# Patient Record
Sex: FEMALE | Race: WHITE | Employment: FULL TIME | URBAN - METROPOLITAN AREA
[De-identification: names, ages, dates, MRNs, and addresses within clinical notes are randomized per-mention and may not be internally consistent; named-entity substitution may affect disease eponyms.]

---

## 2017-02-06 ENCOUNTER — HOSPITAL ENCOUNTER (EMERGENCY)
Age: 27
Discharge: HOME | End: 2017-02-06
Attending: EMERGENCY MEDICINE
Payer: COMMERCIAL

## 2017-02-06 ENCOUNTER — APPOINTMENT (OUTPATIENT)
Dept: ULTRASOUND IMAGING | Age: 27
End: 2017-02-06
Attending: EMERGENCY MEDICINE
Payer: COMMERCIAL

## 2017-02-06 VITALS
TEMPERATURE: 97.9 F | DIASTOLIC BLOOD PRESSURE: 66 MMHG | HEIGHT: 69 IN | RESPIRATION RATE: 16 BRPM | WEIGHT: 166.45 LBS | SYSTOLIC BLOOD PRESSURE: 107 MMHG | OXYGEN SATURATION: 98 % | HEART RATE: 78 BPM | BODY MASS INDEX: 24.65 KG/M2

## 2017-02-06 DIAGNOSIS — O41.8X10 SUBCHORIONIC BLEED, FIRST TRIMESTER: ICD-10-CM

## 2017-02-06 DIAGNOSIS — Z3A.01 LESS THAN 8 WEEKS GESTATION OF PREGNANCY: ICD-10-CM

## 2017-02-06 DIAGNOSIS — R11.2 NAUSEA AND VOMITING, INTRACTABILITY OF VOMITING NOT SPECIFIED, UNSPECIFIED VOMITING TYPE: Primary | ICD-10-CM

## 2017-02-06 DIAGNOSIS — O46.8X1 SUBCHORIONIC BLEED, FIRST TRIMESTER: ICD-10-CM

## 2017-02-06 LAB
ALBUMIN SERPL BCP-MCNC: 3.6 G/DL (ref 3.5–5)
ALBUMIN/GLOB SERPL: 1.1 {RATIO} (ref 1.1–2.2)
ALP SERPL-CCNC: 49 U/L (ref 45–117)
ALT SERPL-CCNC: 39 U/L (ref 12–78)
ANION GAP BLD CALC-SCNC: 7 MMOL/L (ref 5–15)
APPEARANCE UR: ABNORMAL
AST SERPL W P-5'-P-CCNC: 18 U/L (ref 15–37)
BACTERIA URNS QL MICRO: ABNORMAL /HPF
BASOPHILS # BLD AUTO: 0 K/UL (ref 0–0.1)
BASOPHILS # BLD: 0 % (ref 0–1)
BILIRUB SERPL-MCNC: 0.4 MG/DL (ref 0.2–1)
BILIRUB UR QL: NEGATIVE
BUN SERPL-MCNC: 11 MG/DL (ref 6–20)
BUN/CREAT SERPL: 15 (ref 12–20)
CALCIUM SERPL-MCNC: 8.5 MG/DL (ref 8.5–10.1)
CHLORIDE SERPL-SCNC: 106 MMOL/L (ref 97–108)
CO2 SERPL-SCNC: 24 MMOL/L (ref 21–32)
COLOR UR: ABNORMAL
CREAT SERPL-MCNC: 0.73 MG/DL (ref 0.55–1.02)
EOSINOPHIL # BLD: 0 K/UL (ref 0–0.4)
EOSINOPHIL NFR BLD: 0 % (ref 0–7)
EPITH CASTS URNS QL MICRO: ABNORMAL /LPF
ERYTHROCYTE [DISTWIDTH] IN BLOOD BY AUTOMATED COUNT: 12.4 % (ref 11.5–14.5)
GLOBULIN SER CALC-MCNC: 3.2 G/DL (ref 2–4)
GLUCOSE SERPL-MCNC: 103 MG/DL (ref 65–100)
GLUCOSE UR STRIP.AUTO-MCNC: NEGATIVE MG/DL
HCG SERPL-ACNC: ABNORMAL MIU/ML (ref 0–6)
HCT VFR BLD AUTO: 38.6 % (ref 35–47)
HGB BLD-MCNC: 13.8 G/DL (ref 11.5–16)
HGB UR QL STRIP: NEGATIVE
KETONES UR QL STRIP.AUTO: NEGATIVE MG/DL
LEUKOCYTE ESTERASE UR QL STRIP.AUTO: ABNORMAL
LYMPHOCYTES # BLD AUTO: 12 % (ref 12–49)
LYMPHOCYTES # BLD: 1.5 K/UL (ref 0.8–3.5)
MCH RBC QN AUTO: 29.7 PG (ref 26–34)
MCHC RBC AUTO-ENTMCNC: 35.8 G/DL (ref 30–36.5)
MCV RBC AUTO: 83 FL (ref 80–99)
MONOCYTES # BLD: 0.5 K/UL (ref 0–1)
MONOCYTES NFR BLD AUTO: 4 % (ref 5–13)
MUCOUS THREADS URNS QL MICRO: ABNORMAL /LPF
NEUTS SEG # BLD: 10.7 K/UL (ref 1.8–8)
NEUTS SEG NFR BLD AUTO: 84 % (ref 32–75)
NITRITE UR QL STRIP.AUTO: NEGATIVE
PH UR STRIP: 7.5 [PH] (ref 5–8)
PLATELET # BLD AUTO: 208 K/UL (ref 150–400)
POTASSIUM SERPL-SCNC: 3.9 MMOL/L (ref 3.5–5.1)
PROT SERPL-MCNC: 6.8 G/DL (ref 6.4–8.2)
PROT UR STRIP-MCNC: ABNORMAL MG/DL
RBC # BLD AUTO: 4.65 M/UL (ref 3.8–5.2)
RBC #/AREA URNS HPF: ABNORMAL /HPF (ref 0–5)
SODIUM SERPL-SCNC: 137 MMOL/L (ref 136–145)
SP GR UR REFRACTOMETRY: 1.02 (ref 1–1.03)
UROBILINOGEN UR QL STRIP.AUTO: 0.2 EU/DL (ref 0.2–1)
WBC # BLD AUTO: 12.7 K/UL (ref 3.6–11)
WBC URNS QL MICRO: ABNORMAL /HPF (ref 0–4)

## 2017-02-06 PROCEDURE — 74011250636 HC RX REV CODE- 250/636: Performed by: EMERGENCY MEDICINE

## 2017-02-06 PROCEDURE — 80053 COMPREHEN METABOLIC PANEL: CPT | Performed by: EMERGENCY MEDICINE

## 2017-02-06 PROCEDURE — 36415 COLL VENOUS BLD VENIPUNCTURE: CPT | Performed by: EMERGENCY MEDICINE

## 2017-02-06 PROCEDURE — 99284 EMERGENCY DEPT VISIT MOD MDM: CPT

## 2017-02-06 PROCEDURE — 81001 URINALYSIS AUTO W/SCOPE: CPT | Performed by: EMERGENCY MEDICINE

## 2017-02-06 PROCEDURE — 76817 TRANSVAGINAL US OBSTETRIC: CPT

## 2017-02-06 PROCEDURE — 96374 THER/PROPH/DIAG INJ IV PUSH: CPT

## 2017-02-06 PROCEDURE — 84702 CHORIONIC GONADOTROPIN TEST: CPT | Performed by: EMERGENCY MEDICINE

## 2017-02-06 PROCEDURE — 85025 COMPLETE CBC W/AUTO DIFF WBC: CPT | Performed by: EMERGENCY MEDICINE

## 2017-02-06 PROCEDURE — 96376 TX/PRO/DX INJ SAME DRUG ADON: CPT

## 2017-02-06 RX ORDER — ONDANSETRON 4 MG/1
4 TABLET, ORALLY DISINTEGRATING ORAL
Qty: 16 TAB | Refills: 0 | Status: SHIPPED | OUTPATIENT
Start: 2017-02-06

## 2017-02-06 RX ORDER — ONDANSETRON 2 MG/ML
4 INJECTION INTRAMUSCULAR; INTRAVENOUS
Status: COMPLETED | OUTPATIENT
Start: 2017-02-06 | End: 2017-02-06

## 2017-02-06 RX ADMIN — ONDANSETRON HYDROCHLORIDE 4 MG: 2 INJECTION, SOLUTION INTRAMUSCULAR; INTRAVENOUS at 05:57

## 2017-02-06 RX ADMIN — ONDANSETRON HYDROCHLORIDE 4 MG: 2 INJECTION, SOLUTION INTRAMUSCULAR; INTRAVENOUS at 07:02

## 2017-02-06 NOTE — ED PROVIDER NOTES
HPI Comments: Chaim Celeste is a 6 week gravid by date, 31 y.o. female (G1) who presents ambulatory to the ED with c/o n/v/d x 0100 this morning. She reports associated crampy periumbilical discomfort. Pt states her diarrhea has since ceased but continues to have nausea and vomiting. She denies any recent sick contacts. Pt states she has not yet seen her OBGYN but has an appointment in 2 weeks. She denies having had any abd pain beyond the cramping she has today and further denies any vaginal bleeding, discharge, gush of fluid, urinary sxs, bowel changes, or fever. PCP: Josse Yap MD  PMHx significant for: pt denies   PSHx significant for: pt denies   Social Hx:  smoking (-)                      alcohol (-)                        There are no other complaints, changes, or physical findings at this time. Written by HARRY Morrell, as dictated by Fernando Goldstein DO     The history is provided by the patient. History reviewed. No pertinent past medical history. History reviewed. No pertinent past surgical history. History reviewed. No pertinent family history. Social History     Social History    Marital status:      Spouse name: N/A    Number of children: N/A    Years of education: N/A     Occupational History    Not on file. Social History Main Topics    Smoking status: Never Smoker    Smokeless tobacco: Not on file    Alcohol use No    Drug use: No    Sexual activity: Not on file     Other Topics Concern    Not on file     Social History Narrative    No narrative on file         ALLERGIES: Advil [ibuprofen] and Demerol [meperidine]    Review of Systems   Constitutional: Negative for fatigue and fever. HENT: Negative. Eyes: Negative. Respiratory: Negative for shortness of breath and wheezing. Cardiovascular: Negative for chest pain and leg swelling. Gastrointestinal: Positive for abdominal pain, diarrhea, nausea and vomiting.  Negative for blood in stool and constipation. Endocrine: Negative. Genitourinary: Negative for difficulty urinating, dysuria, vaginal bleeding and vaginal discharge. Musculoskeletal: Negative. Skin: Negative for rash. Allergic/Immunologic: Negative. Neurological: Negative for weakness and numbness. Hematological: Negative. Psychiatric/Behavioral: Negative. Patient Vitals for the past 12 hrs:   Temp Pulse Resp BP SpO2   02/06/17 0954 - - - - 98 %   02/06/17 0916 - - - 107/66 -   02/06/17 0800 - - - 97/73 98 %   02/06/17 0717 - - - 98/65 100 %   02/06/17 0630 - - - 113/68 100 %   02/06/17 0615 - - - 106/54 100 %   02/06/17 0600 - - - 109/71 99 %   02/06/17 0545 - - - 107/69 97 %   02/06/17 0536 - - - 132/81 -   02/06/17 0505 97.9 °F (36.6 °C) 78 16 110/69 99 %             Physical Exam   Constitutional: She is oriented to person, place, and time. She appears well-developed and well-nourished. No distress. HENT:   Head: Normocephalic and atraumatic. Mouth/Throat: Oropharynx is clear and moist.   Eyes: Conjunctivae and EOM are normal.   Neck: Neck supple. No JVD present. No tracheal deviation present. Cardiovascular: Normal rate, regular rhythm and intact distal pulses. Exam reveals no gallop and no friction rub. No murmur heard. Pulmonary/Chest: Effort normal and breath sounds normal. No stridor. No respiratory distress. She has no wheezes. Abdominal: Soft. Bowel sounds are normal. She exhibits no distension and no mass. There is no tenderness. There is no guarding. Musculoskeletal: Normal range of motion. She exhibits no edema or tenderness. No deformity   Neurological: She is alert and oriented to person, place, and time. She has normal strength. No focal deficits   Skin: Skin is warm, dry and intact. No rash noted. Psychiatric: She has a normal mood and affect. Her behavior is normal. Judgment and thought content normal.   Nursing note and vitals reviewed.        MDM  Number of Diagnoses or Management Options  Less than 8 weeks gestation of pregnancy:   Nausea and vomiting, intractability of vomiting not specified, unspecified vomiting type:   Subchorionic bleed, first trimester:   Diagnosis management comments: Pt with n/v/d, is 6 weeks pregnant and has not had her first prenatal visit yet. Lower suspicion that symptoms are pregnancy related as patient's abdominal exam is benign, and she has no vaginal bleeding or discharge. DDx includes gastroenteritis, dehydration, electrolyte abnormality, uti. Will treat symptomatically, check labs. Will confirm IUP. Amount and/or Complexity of Data Reviewed  Clinical lab tests: ordered and reviewed      ED Course       Procedures     Progress Note:  6:21 AM  Pt noted to be feeling better. Written by Lisa Kothari, ED scribe, as dictated by Warren Jennings DO      SIGN OUT:  8:30 AM  Patient's presentation, labs/imaging and plan of care was reviewed with Tanya Temple MD as part of sign out. They will f/u on US results as part of the plan discussed with the patient. Tanya Temple MD's assistance in completion of this plan is greatly appreciated but it should be noted that I will be the provider of record for this patient. Warren Jennings DO    PROGRESS NOTE:   10:25 AM  Pt feels okay. Will PO challenge her.   Written by Rafi Solo, ED Scribe, as dictated by Tanya Temple MD.     LABORATORY TESTS:  Recent Results (from the past 12 hour(s))   CBC WITH AUTOMATED DIFF    Collection Time: 02/06/17  5:39 AM   Result Value Ref Range    WBC 12.7 (H) 3.6 - 11.0 K/uL    RBC 4.65 3.80 - 5.20 M/uL    HGB 13.8 11.5 - 16.0 g/dL    HCT 38.6 35.0 - 47.0 %    MCV 83.0 80.0 - 99.0 FL    MCH 29.7 26.0 - 34.0 PG    MCHC 35.8 30.0 - 36.5 g/dL    RDW 12.4 11.5 - 14.5 %    PLATELET 565 630 - 711 K/uL    NEUTROPHILS 84 (H) 32 - 75 %    LYMPHOCYTES 12 12 - 49 %    MONOCYTES 4 (L) 5 - 13 %    EOSINOPHILS 0 0 - 7 %    BASOPHILS 0 0 - 1 %    ABS. NEUTROPHILS 10.7 (H) 1.8 - 8.0 K/UL    ABS. LYMPHOCYTES 1.5 0.8 - 3.5 K/UL    ABS. MONOCYTES 0.5 0.0 - 1.0 K/UL    ABS. EOSINOPHILS 0.0 0.0 - 0.4 K/UL    ABS. BASOPHILS 0.0 0.0 - 0.1 K/UL   METABOLIC PANEL, COMPREHENSIVE    Collection Time: 02/06/17  5:39 AM   Result Value Ref Range    Sodium 137 136 - 145 mmol/L    Potassium 3.9 3.5 - 5.1 mmol/L    Chloride 106 97 - 108 mmol/L    CO2 24 21 - 32 mmol/L    Anion gap 7 5 - 15 mmol/L    Glucose 103 (H) 65 - 100 mg/dL    BUN 11 6 - 20 MG/DL    Creatinine 0.73 0.55 - 1.02 MG/DL    BUN/Creatinine ratio 15 12 - 20      GFR est AA >60 >60 ml/min/1.73m2    GFR est non-AA >60 >60 ml/min/1.73m2    Calcium 8.5 8.5 - 10.1 MG/DL    Bilirubin, total 0.4 0.2 - 1.0 MG/DL    ALT (SGPT) 39 12 - 78 U/L    AST (SGOT) 18 15 - 37 U/L    Alk. phosphatase 49 45 - 117 U/L    Protein, total 6.8 6.4 - 8.2 g/dL    Albumin 3.6 3.5 - 5.0 g/dL    Globulin 3.2 2.0 - 4.0 g/dL    A-G Ratio 1.1 1.1 - 2.2     URINALYSIS W/ RFLX MICROSCOPIC    Collection Time: 02/06/17  5:39 AM   Result Value Ref Range    Color YELLOW/STRAW      Appearance CLOUDY (A) CLEAR      Specific gravity 1.023 1.003 - 1.030      pH (UA) 7.5 5.0 - 8.0      Protein TRACE (A) NEG mg/dL    Glucose NEGATIVE  NEG mg/dL    Ketone NEGATIVE  NEG mg/dL    Bilirubin NEGATIVE  NEG      Blood NEGATIVE  NEG      Urobilinogen 0.2 0.2 - 1.0 EU/dL    Nitrites NEGATIVE  NEG      Leukocyte Esterase MODERATE (A) NEG      WBC 5-10 0 - 4 /hpf    RBC 0-5 0 - 5 /hpf    Epithelial cells MODERATE (A) FEW /lpf    Bacteria 1+ (A) NEG /hpf    Mucus 1+ (A) NEG /lpf   TOTAL HCG, QT. Collection Time: 02/06/17  5:39 AM   Result Value Ref [place]    [place] [address] (H) 0 - 6 MIU/ML       IMAGING RESULTS:  US UTS TRANSVAGINAL OB   Final Result   Clinical history: Pregnant, nausea, vomiting      Realtime sonographic imaging of the pelvis was performed transvaginally. The  uterus measures 9.3 x 4.8 x 6.0 cm and is normal in appearance.  The examination  demonstrates a single viable intrauterine pregnancy with a crown-rump length of  0.45 cm and estimated gestational age of 6 weeks 2 days. The gestational sac is  normal in size and appearance. There is a small subchorionic bleed. The  placenta cannot be evaluated given the early age of the pregnancy. Fetal heart  rate is 111 beats per minute. The left ovary measures 3.3 x 3.1 x 2.4 cm and the  right ovary measures 2.7 x 0.9 x 1.8 cm. There is a 2.7 x 2.0 x 2.0 cm simple  cyst in the left ovary. The right ovary is normal in appearance. Blood flow is  documented within both ovaries. Trace free fluid is noted in the cul-de-sac.     IMPRESSION  IMPRESSION: Single viable intrauterine pregnancy with estimated gestational age  of 6 weeks 2 days. Small subchorionic bleed. Simple cyst left ovary. Signed by      Signed Date/Time    Phone Pager     Petrais[place] Jaret 2/06/2017 09:35 [phone]           MEDICATIONS GIVEN:  Medications   ondansetron [facility] injection 4 mg (4 mg IntraVENous Given 2/6/17 0557)   ondansetron (ZOFRAN) injection 4 mg (4 mg IntraVENous Given 2/6/17 0702)       IMPRESSION:  1. Nausea and vomiting, intractability of vomiting not specified, unspecified vomiting type    2. Less than 8 weeks gestation of pregnancy    3. Subchorionic bleed, first trimester        PLAN:  1. Current Discharge Medication List      START taking these medications    Details   ondansetron (ZOFRAN ODT) 4 mg disintegrating tablet Take 1 Tab by mouth every eight (8) hours as needed for Nausea. Qty: 16 Tab, Refills: 0         CONTINUE these medications which have NOT CHANGED    Details   Cetirizine (ZYRTEC) 10 mg cap Take 10 mg by mouth.      prenatal multivit-ca-min-fe-fa tab Take  by mouth.            2.   Follow-up Information     Follow up With Details Comments Maurizio Jackson MD In 10 days  [address] [facility] [address] [place] [address] Pkw[address]  [address] [facility] Gayathri Delarosa MD   [address] [place] [address] [address] [phone]      [facility] EMERGENCY DEPT  As needed, If symptoms worsen [address]  [address] [facility]  [phone]        Return to ED if worse     Discharge Note:  10:29 AM  The patient is ready for discharge. The patient's signs, symptoms, diagnosis, and discharge instruction have been discussed and the patient has conveyed their understanding. The patient is to follow up as recommended or return to the ER should their symptoms worsen. Plan has been discussed and the patient is in agreement. Written by Sree Roberts, ED Scribe, as dictated by Keyona Vinson MD.     This note is prepared by Chucky Cummings, acting as scribe for Lina Fritz DO: The scribe's documentation has been prepared under my direction and personally reviewed by me in its entirety. I confirm that the note above accurately reflects all work, treatment, procedures, and medical decision making performed by me.

## 2017-02-06 NOTE — ED NOTES
Patient resting on stretcher with lights lowered for comfort. Denies pain. Plan of care reviewed and opportunity for questions was provided.

## 2017-02-06 NOTE — ED NOTES
Bedside shift change report given to Jhon Fiore  (oncoming nurse) by [facility]  (offgoing nurse). Report included the following information SBAR and ED Summary.

## 2017-02-06 NOTE — DISCHARGE INSTRUCTIONS
Weeks 6 to 10 of Your Pregnancy: Care Instructions  Your Care Instructions    Congratulations on your pregnancy. This is an exciting and important time for you. During the first 6 to 10 weeks of your pregnancy, your body goes through many changes. Your baby grows very fast, even though you cannot feel it yet. You may start to notice that you feel different, both in your body and your emotions. Because each woman's pregnancy is unique, there is no right way to feel. You may feel the healthiest you have ever been, or you may feel tired or sick to your stomach (\"morning sickness\"). These early weeks are a time to make healthy choices and to eat the best foods for you and your baby. This care sheet will give you some ideas. This is also a good time to think about birth defects testing. These are tests done during pregnancy to look for possible problems with the baby. First trimester tests for birth defects can be done between 8 and 17 weeks of pregnancy, depending on the test. Talk with your doctor about what kinds of tests are available. Follow-up care is a key part of your treatment and safety. Be sure to make and go to all appointments, and call your doctor if you are having problems. It's also a good idea to know your test results and keep a list of the medicines you take. How can you care for yourself at home? Eat well  · Eat at least 3 meals and 2 healthy snacks every day. Eat fresh, whole foods, including:  ¨ 7 or more servings of bread, tortillas, cereal, rice, pasta, or oatmeal.  ¨ 3 or more servings of vegetables, especially leafy green vegetables. ¨ 2 or more servings of fruits. ¨ 3 or more servings of milk, yogurt, or cheese. ¨ 2 or more servings of meat, turkey, chicken, fish, eggs, or dried beans. · Drink plenty of fluids, especially water. Avoid sodas and other sweetened drinks. · Choose foods that have important vitamins for your baby, such as calcium, iron, and folate.   ¨ Dairy products, tofu, canned fish with bones, almonds, broccoli, dark leafy greens, corn tortillas, and fortified orange juice are good sources of calcium. ¨ Beef, poultry, liver, spinach, lentils, dried beans, fortified cereals, and dried fruits are rich in iron. ¨ Dark leafy greens, broccoli, asparagus, liver, fortified cereals, orange juice, peanuts, and almonds are good sources of folate. · Avoid foods that could harm your baby. ¨ Do not eat raw or undercooked meat, chicken, or fish (such as sushi or raw oysters). ¨ Do not eat raw eggs or foods that contain raw eggs, such as Caesar dressing. ¨ Do not eat soft cheeses and unpasteurized dairy foods, such as Brie, feta, or blue cheese. ¨ Do not eat fish that contains a lot of mercury, such as shark, swordfish, tilefish, or nancy mackerel. Do not eat more than 6 ounces of tuna each week. ¨ Do not eat raw sprouts, especially alfalfa sprouts. ¨ Cut down on caffeine, such as coffee, tea, and cola. Protect yourself and your baby  · Do not touch gina litter or cat feces. They can cause an infection that could harm your baby. · High body temperature can be harmful to your baby. So if you want to use a sauna or hot tub, be sure to talk to your doctor about how to use it safely. [place] with morning sickness  · Sip small amounts of water, juices, or shakes. Try drinking between meals, not with meals. · Eat 5 or 6 small meals a day. Try dry toast or crackers when you first get up, and eat breakfast a little later. · Avoid spicy, greasy, and fatty foods. · When you feel sick, open your windows or go for a short walk to get fresh air. · Try nausea wristbands. These help some women. · Tell your doctor if you think your prenatal vitamins make you sick. Where can you learn more? Go to http://annetet.info/. Enter G112 in the search box to learn more about \"Weeks 6 to 10 of Your Pregnancy: Care Instructions. \"  Current as of:  May 30, 2016  Content Version: 11.1  © 6064-6302 [employer]. Care instructions adapted under license by [employer] (which disclaims liability or warranty for this information). If you have questions about a medical condition or this instruction, always ask your healthcare professional. [address] any warranty or liability for your use of this information. Nausea and Vomiting: Care Instructions  Your Care Instructions    When you are nauseated, you may feel weak and sweaty and notice a lot of saliva in your mouth. Nausea often leads to vomiting. Most of the time you do not need to worry about nausea and vomiting, but they can be signs of other illnesses. Two common causes of nausea and vomiting are stomach flu and food poisoning. Nausea and vomiting from viral stomach flu will usually start to improve within 24 hours. Nausea and vomiting from food poisoning may last from 12 to 48 hours. The doctor has checked you carefully, but problems can develop later. If you notice any problems or new symptoms, get medical treatment right away. Follow-up care is a key part of your treatment and safety. Be sure to make and go to all appointments, and call your doctor if you are having problems. It's also a good idea to know your test results and keep a list of the medicines you take. How can you care for yourself at home? · To prevent dehydration, drink plenty of fluids, enough so that your urine is light yellow or clear like water. Choose water and other caffeine-free clear liquids until you feel better. If you have kidney, heart, or liver disease and have to limit fluids, talk with your doctor before you increase the amount of fluids you drink. · Rest in bed until you feel better. · When you are able to eat, try clear soups, mild foods, and liquids until all symptoms are gone for 12 to 48 hours.  Other good choices include dry toast, crackers, cooked cereal, and gelatin dessert, such as [place]. When should you call for help? Call 911 anytime you think you may need emergency care. For example, call if:  · You passed out (lost consciousness). Call your doctor now or seek immediate medical care if:  · You have symptoms of dehydration, such as:  ¨ Dry eyes and a dry mouth. ¨ Passing only a little dark urine. ¨ Feeling thirstier than usual.  · You have new or worsening belly pain. · You have a new or higher fever. · You vomit blood or what looks like coffee grounds. Watch closely for changes in your health, and be sure to contact your doctor if:  · You have ongoing nausea and vomiting. · Your vomiting is getting worse. · Your vomiting lasts longer than 2 days. · You are not getting better as expected. Where can you learn more? Go to http://arron-varun.info/. Enter 25 [id number] in the search box to learn more about \"Nausea and Vomiting: Care Instructions. \"  Current as of: May 27, 2016  Content Version: 11.1  © 6818-3800 [employer]. Care instructions adapted under license by [employer] (which disclaims liability or warranty for this information). If you have questions about a medical condition or this instruction, always ask your healthcare professional. [address] any warranty or liability for your use of this information.

## 2017-02-17 LAB
CHLAMYDIA, EXTERNAL: NORMAL
HBSAG, EXTERNAL: NON REACTIVE
HIV, EXTERNAL: NON REACTIVE
N. GONORRHEA, EXTERNAL: NORMAL
RUBELLA, EXTERNAL: NORMAL
TYPE, ABO & RH, EXTERNAL: NORMAL

## 2017-07-12 LAB
ANTIBODY SCREEN, EXTERNAL: NORMAL
T. PALLIDUM, EXTERNAL: NORMAL

## 2017-09-08 LAB — GRBS, EXTERNAL: POSITIVE

## 2017-10-05 ENCOUNTER — HOSPITAL ENCOUNTER (INPATIENT)
Age: 27
LOS: 3 days | Discharge: HOME | End: 2017-10-08
Attending: OBSTETRICS & GYNECOLOGY | Admitting: OBSTETRICS & GYNECOLOGY
Payer: COMMERCIAL

## 2017-10-05 PROBLEM — Z37.9 NORMAL LABOR: Status: ACTIVE | Noted: 2017-10-05

## 2017-10-05 LAB
BASOPHILS # BLD: 0 K/UL (ref 0–0.1)
BASOPHILS NFR BLD: 0 % (ref 0–1)
EOSINOPHIL # BLD: 0.1 K/UL (ref 0–0.4)
EOSINOPHIL NFR BLD: 1 % (ref 0–7)
ERYTHROCYTE [DISTWIDTH] IN BLOOD BY AUTOMATED COUNT: 12.7 % (ref 11.5–14.5)
HCT VFR BLD AUTO: 37.3 % (ref 35–47)
HGB BLD-MCNC: 13.4 G/DL (ref 11.5–16)
LYMPHOCYTES # BLD: 1.7 K/UL (ref 0.8–3.5)
LYMPHOCYTES NFR BLD: 19 % (ref 12–49)
MCH RBC QN AUTO: 30.9 PG (ref 26–34)
MCHC RBC AUTO-ENTMCNC: 35.9 G/DL (ref 30–36.5)
MCV RBC AUTO: 86.1 FL (ref 80–99)
MONOCYTES # BLD: 0.5 K/UL (ref 0–1)
MONOCYTES NFR BLD: 5 % (ref 5–13)
NEUTS SEG # BLD: 6.6 K/UL (ref 1.8–8)
NEUTS SEG NFR BLD: 75 % (ref 32–75)
PLATELET # BLD AUTO: 175 K/UL (ref 150–400)
RBC # BLD AUTO: 4.33 M/UL (ref 3.8–5.2)
WBC # BLD AUTO: 8.8 K/UL (ref 3.6–11)

## 2017-10-05 PROCEDURE — 74011250637 HC RX REV CODE- 250/637

## 2017-10-05 PROCEDURE — 36415 COLL VENOUS BLD VENIPUNCTURE: CPT | Performed by: OBSTETRICS & GYNECOLOGY

## 2017-10-05 PROCEDURE — 77030007880 HC KT SPN EPDRL BBMI -B

## 2017-10-05 PROCEDURE — 75410000002 HC LABOR FEE PER 1 HR

## 2017-10-05 PROCEDURE — 77030034849

## 2017-10-05 PROCEDURE — 74011250637 HC RX REV CODE- 250/637: Performed by: OBSTETRICS & GYNECOLOGY

## 2017-10-05 PROCEDURE — 85025 COMPLETE CBC W/AUTO DIFF WBC: CPT | Performed by: OBSTETRICS & GYNECOLOGY

## 2017-10-05 PROCEDURE — 74011250636 HC RX REV CODE- 250/636: Performed by: OBSTETRICS & GYNECOLOGY

## 2017-10-05 PROCEDURE — 74011000258 HC RX REV CODE- 258: Performed by: OBSTETRICS & GYNECOLOGY

## 2017-10-05 PROCEDURE — 65410000002 HC RM PRIVATE OB

## 2017-10-05 RX ORDER — NALBUPHINE HYDROCHLORIDE 10 MG/ML
10 INJECTION, SOLUTION INTRAMUSCULAR; INTRAVENOUS; SUBCUTANEOUS
Status: DISCONTINUED | OUTPATIENT
Start: 2017-10-05 | End: 2017-10-06 | Stop reason: HOSPADM

## 2017-10-05 RX ORDER — SODIUM CHLORIDE 0.9 % (FLUSH) 0.9 %
5-10 SYRINGE (ML) INJECTION AS NEEDED
Status: DISCONTINUED | OUTPATIENT
Start: 2017-10-05 | End: 2017-10-08 | Stop reason: HOSPADM

## 2017-10-05 RX ORDER — ONDANSETRON 2 MG/ML
4 INJECTION INTRAMUSCULAR; INTRAVENOUS
Status: DISCONTINUED | OUTPATIENT
Start: 2017-10-05 | End: 2017-10-06 | Stop reason: HOSPADM

## 2017-10-05 RX ORDER — NALOXONE HYDROCHLORIDE 0.4 MG/ML
0.4 INJECTION, SOLUTION INTRAMUSCULAR; INTRAVENOUS; SUBCUTANEOUS AS NEEDED
Status: DISCONTINUED | OUTPATIENT
Start: 2017-10-05 | End: 2017-10-06 | Stop reason: HOSPADM

## 2017-10-05 RX ORDER — SODIUM CHLORIDE 0.9 % (FLUSH) 0.9 %
5-10 SYRINGE (ML) INJECTION EVERY 8 HOURS
Status: DISCONTINUED | OUTPATIENT
Start: 2017-10-05 | End: 2017-10-06 | Stop reason: HOSPADM

## 2017-10-05 RX ORDER — SODIUM CHLORIDE, SODIUM LACTATE, POTASSIUM CHLORIDE, CALCIUM CHLORIDE 600; 310; 30; 20 MG/100ML; MG/100ML; MG/100ML; MG/100ML
125 INJECTION, SOLUTION INTRAVENOUS CONTINUOUS
Status: DISCONTINUED | OUTPATIENT
Start: 2017-10-05 | End: 2017-10-06 | Stop reason: HOSPADM

## 2017-10-05 RX ORDER — ZOLPIDEM TARTRATE 5 MG/1
5 TABLET ORAL
Status: DISCONTINUED | OUTPATIENT
Start: 2017-10-05 | End: 2017-10-06 | Stop reason: HOSPADM

## 2017-10-05 RX ADMIN — SODIUM CHLORIDE 5 MILLION UNITS: 900 INJECTION, SOLUTION INTRAVENOUS at 09:37

## 2017-10-05 RX ADMIN — DINOPROSTONE 10 MG: 10 INSERT VAGINAL at 21:40

## 2017-10-05 RX ADMIN — SODIUM CHLORIDE, SODIUM LACTATE, POTASSIUM CHLORIDE, AND CALCIUM CHLORIDE 125 ML/HR: 600; 310; 30; 20 INJECTION, SOLUTION INTRAVENOUS at 09:30

## 2017-10-05 RX ADMIN — ZOLPIDEM TARTRATE 5 MG: 5 TABLET ORAL at 21:52

## 2017-10-05 RX ADMIN — PENICILLIN G POTASSIUM 2.5 MILLION UNITS: 20000000 POWDER, FOR SOLUTION INTRAVENOUS at 20:11

## 2017-10-05 RX ADMIN — PENICILLIN G POTASSIUM 2.5 MILLION UNITS: 20000000 POWDER, FOR SOLUTION INTRAVENOUS at 17:16

## 2017-10-05 RX ADMIN — PENICILLIN G POTASSIUM 2.5 MILLION UNITS: 20000000 POWDER, FOR SOLUTION INTRAVENOUS at 13:42

## 2017-10-05 NOTE — PROGRESS NOTES
1035: Per Dr. Silvana Walker, ok for intermittent monitoring. Ok to ambulate in wilde. Taken off EFM, PCN complete. Ambulating in room with .

## 2017-10-05 NOTE — PROGRESS NOTES
Bedside shift change report given to Jasmine Vargas , ARIANA by RICHARD Mccloud RN. Report included the following information SBAR, Kardex, Intake/Output, MAR, Accordion, Recent Results and Med Rec Status. Hourly Rounding performed by RN.   Hayder Rojo, RN

## 2017-10-05 NOTE — PROGRESS NOTES
1425: Taken off EFM. , reactive. Ambulating in wilde with . 1520: Placed back on EFM.   1555: Taken off EFM. , increased fetal movement. NST reactive. 1630: Placed on EFM. Dr. Lenore Jorge to come assess patient. 10/05/17 1635   Cervical Exam   Dilation (cm) 2   Eff 70 %   Vaginal exam done by? Dr. Lenore Jorge     POC continue to monitor intermittently. 1715: Placed on EFM. 1800: Taken of EFM. , NST reactive. 1855: Placed on EFM.

## 2017-10-05 NOTE — PROGRESS NOTES
0915: Pt admitted to 3169 for SROM at 0730 at home. Clear fluid noted. Denies bleeding. Reports mild abdominal pressure intermittently. , 40w4d. EFM applied, IV started, and consents signed.

## 2017-10-05 NOTE — IP AVS SNAPSHOT
[address] [facility] 
[phone] Patient: Meena Mendosa MRN: [id number] You are allergic to the following Allergen Reactions Advil (Ibuprofen) Swelling Aspirin Swelling Demerol (Meperidine) Rash Immunizations Administered for This Admission Name Date Influenza Vaccine (Quad) PF 10/8/2017 Rho(D) Immune Globulin - IM 10/8/2017 Recent Documentation Height Weight Breastfeeding? BMI OB Status Smoking Status 1.727 m 99.8 kg Yes 33.45 kg/m2 Recent pregnancy Never Smoker Unresulted Labs Order Current Status SAMPLE TO BLOOD BANK In process RH IMMUNE GLOBULIN EVAL-LAB ORDER Preliminary result Emergency Contacts Name Discharge Info Relation Home Work Mobile [address] [facility] CAREGIVER [3] Spouse [3] [phone] About your hospitalization You were admitted on:  October 5, 2017 You last received care in the:  [facility] LABOR [facility] DELIVERY You were discharged on:  October 8, 2017 Unit phone number:  [phone] Why you were hospitalized Your primary diagnosis was:  Not on File Your diagnoses also included:  Normal Labor Providers Seen During Your Hospitalizations Provider Role Specialty Primary office phone Lauren Jones MD Attending Provider Obstetrics & Gynecology [phone] Your Primary Care Physician (PCP) Primary Care Physician Office Phone Office Fax Evelyn Daly, [address] [phone] Follow-up Information Follow up With Details Comments Contact Info Lauren Jones MD In 2 weeks perineal laceration check [address] [facility] 
[phone] Raudel Coombs MD   [address] [place] [address] [address] [phone]  Lauren Jones MD In 2 weeks Follow up with [facility] in 2 weeks; call to schedule appointment. [address] [facility] [place] 
[phone] Current Discharge Medication List  
  
START taking these medications Dose & Instructions Dispensing Information Comments Morning Noon Evening Bedtime  
 ferrous sulfate 325 mg (65 mg iron) tablet Your last dose was: Your next dose is:    
   
   
 Dose:  325 mg Take 1 Tab by mouth two (2) times daily (with meals). Quantity:  60 Tab Refills:  1 HYDROmorphone 2 mg tablet Commonly known as:  DILAUDID Your last dose was: Your next dose is:    
   
   
 Dose:  2 mg Take 1 Tab by mouth every four (4) hours as needed for Pain. Max Daily Amount: 12 mg. Quantity:  30 Tab Refills:  0  
     
   
   
   
  
 oxyCODONE-acetaminophen 5-325 mg per tablet Commonly known as:  PERCOCET Your last dose was: Your next dose is:    
   
   
 Dose:  1 Tab Take 1 Tab by mouth every four (4) hours as needed for Pain. Max Daily Amount: 6 Tabs. Quantity:  30 Tab Refills:  0  
     
   
   
   
  
 polyethylene glycol 17 gram packet Commonly known as:  Marlene Floor Your last dose was: Your next dose is:    
   
   
 Dose:  17 g Take 1 Packet by mouth daily as needed. Quantity:  30 Packet Refills:  1 CONTINUE these medications which have NOT CHANGED Dose & Instructions Dispensing Information Comments Morning Noon Evening Bedtime  
 ondansetron 4 mg disintegrating tablet Commonly known as:  ZOFRAN ODT Your last dose was: Your next dose is:    
   
   
 Dose:  4 mg Take 1 Tab by mouth every eight (8) hours as needed for Nausea. Quantity:  16 Tab Refills:  0  
     
   
   
   
  
 prenatal multivit-ca-min-fe-fa Tab Your last dose was: Your next dose is: Take  by mouth. Refills:  0 ZyrTEC 10 mg Cap Generic drug:  Cetirizine Your last dose was: Your next dose is:    
   
   
 Dose:  10 mg Take 10 mg by mouth. Refills:  0 Where to Get Your Medications Information on where to get these meds will be given to you by the nurse or doctor. ! Ask your nurse or doctor about these medications  
  ferrous sulfate 325 mg (65 mg iron) tablet HYDROmorphone 2 mg tablet  
 oxyCODONE-acetaminophen 5-325 mg per tablet  
 polyethylene glycol 17 gram packet Discharge Instructions POST DELIVERY DISCHARGE INSTRUCTIONS Name: Adenike Monroe YOB: 1990 Primary Diagnosis: Active Problems: 
  Normal labor (10/5/2017) General:  
 
Diet/Diet Restrictions: 
· Eight 8-ounce glasses of fluid daily (water, juices); avoid excessive caffeine intake. · Meals/snacks as desired which are high in fiber and carbohydrates and low in fat and cholesterol. Physical Activity / Restrictions / Safety: · Avoid heavy lifting, no more that 8 lbs. For 2-3 weeks;  
· Limit use of stairs to 2 times daily for the first week home. · No driving for one week. · Avoid intercourse 4-6 weeks, no douching or tampon use. · Check with obstetrician before starting or resuming an exercise program.   
 
Discharge Instructions/Special Treatment/Home Care Needs:  
 
· Continue prenatal vitamins. · Continue to use squirt bottle with warm water on your episiotomy after each bathroom use until bleeding stops. · If steri-strips applied to your incision, remove in 7-10 days. Call your doctor for the following: · Fever over 101 degrees by mouth. · Vaginal bleeding heavier than a normal menstrual period or clots larger than a golf ball. · Red streaks or increased swelling of legs, painful red streaks on your breast. 
· Painful urination, constipation and increased pain or swelling or discharge with your incision. · If you feel extremely anxious or overwhelmed. · If you have thoughts of harming yourself and/or your baby. Pain Management:  
 
· Take Acetaminophen (Tylenol) or Ibuprofen (Advil, Motrin), as directed for pain. · Use a warm Sitz bath 3 times daily to relieve episiotomy or hemorrhoidal discomfort. · For hemorrhoidal discomfort, use Tucks and Anusol cream as needed and directed. · Heating pad to  incision as needed. Follow-Up Care:  
 
Appointment with MD: Follow-up Appointments Procedures  FOLLOW UP VISIT Appointment in: Two Weeks Standing Status:   Standing Number of Occurrences:   1 Order Specific Question:   Appointment in Answer: Two Weeks Telephone number: [phone] Signed By: Tatiana Simmons MD                                                                                                   Date: 10/8/2017 Time: 8:54 AM 
 
Vaginal Childbirth (Postpartum Care): After Your Visit Your Care Instructions After childbirth (postpartum period), your body goes through many changes. Some of these changes happen over several weeks. It is easy to get too tired and overwhelmed during the first weeks after childbirth. Take it easy on yourself. You may find it hard to meet the extra demands on your energy and time. Get rest whenever you can, accept help from others, and eat well and drink plenty of fluids. Your body will slowly heal in the next few weeks. You may feel emotional during this time. Changes in your hormones can shift your mood without warning. No heavy lifting. No more than 8 lbs or the size of baby for 2-3 weeks. Avoid stairs. Limit to 1-2 times per day for the 1st week after delivery. No driving for the first week after delivery. Follow-up care is a key part of your treatment and safety.  Be sure to make and go to all appointments, and call your doctor if you are having problems. Its also a good idea to know your test results and keep a list of the medicines you take. Around 4 to 6 weeks after your baby's birth, you will have a follow-up visit with your doctor. This visit is your time to talk to your doctor about anything you are concerned or curious about. Keep a list of questions to bring to your postpartum visit. Your questions might be about: 
Changes in your breasts, such as lumps or soreness. When to expect your menstrual period to start again. What form of birth control is best for you. Weight you have put on during the pregnancy. Exercise options. What foods and drinks are best for you, especially if you are breast-feeding. Problems you might be having with breast-feeding. When you can have sex. Some women may want to talk about lubricants for the vagina. Any feelings of sadness or restlessness that you are having. How can you care for yourself at home? Vaginal bleeding and cramps After delivery, you will have a bloody discharge from the vagina. This will turn pink within a week and then white or yellow after about 10 days. It may last for 2 to 4 weeks or longer, until the uterus has healed. Do not rinse inside your vagina with fluids (douche). Do not worry if you pass some blood clots, as long as they are smaller than a golf ball. If you have a tear or stitches in your vaginal area, change the pad at least every 4 hours to prevent soreness and infection. You may have cramps for the first few days after childbirth. These are normal and occur as the uterus shrinks to normal size. Take an over-the-counter pain medicine, such as acetaminophen (Tylenol), ibuprofen (Advil, Motrin), or naproxen (Aleve), for cramps. Read and follow all instructions on the label. Do not take aspirin, because it can cause more bleeding.  
Do not take two or more pain medicines at the same time unless the doctor told you to. Many pain medicines have acetaminophen, which is Tylenol. Too much acetaminophen (Tylenol) can be harmful. Stitches If you have stitches, they will dissolve on their own and do not need to be removed. Follow your doctor's instructions for cleaning the stitched area. Put ice or a cold pack on your painful area for 10 to 20 minutes at a time. , several times a day, for the first few days. Put a thin cloth between the ice and your skin. Sit in a few inches of warm water (sitz bath) 3 times a day and after bowel movements. The warm water helps with pain and itching. If you do not have a tub, a warm shower might help. Keep the area clean by pouring or spraying warm water over the area outside your vagina and anus after you use the toilet. Breast fullness Your breasts may overfill (engorge) in the first few days after delivery. To help milk flow and to relieve pain, warm your breasts in the shower or by using warm, moist towels before nursing. If you are not nursing, do not put warmth on your breasts or touch your breasts. Wear a tight bra or sports bra and use ice until the fullness goes away. This usually takes 2 to 3 days. Put ice or a cold pack on your breast after nursing to reduce swelling and pain. Put a thin cloth between the ice and your skin. Activity Eat a balanced diet. Do not try to lose weight by cutting calories. Keep taking your prenatal vitamins, or take a multivitamin. ·  Get help with household chores from family or friends, if you can. Do not try to do it all yourself. Get as much rest as you can. Try to take naps when your baby sleeps during the day. Get some exercise every day. But do not do any heavy exercise until your doctor says it is okay. Do not have sex or use tampons until you have stopped bleeding and at least 4 to 6 weeks have passed since you gave birth. Talk to your doctor about birth control.  You can get pregnant even before your period returns. Also, you can get pregnant while you are breast-feeding. Mental health It is normal to have some sadness, anxiety, sleeplessness, and mood swings after you go home. If you feel upset or hopeless for more than a few days or are having trouble doing the things you need to do, talk to your doctor. · Many women get the \"baby blues\" during the first few days after childbirth. The \"baby blues\" usually peak around the fourth day and then ease up in less than 2 weeks. If you have the \"baby blues\" for more than a few days, or if you have thoughts of hurting yourself or your baby, call your doctor right away. Constipation and hemorrhoids Drink plenty of fluids, enough so that your urine is light yellow or clear like water. If you have kidney, heart, or liver disease and have to limit fluids, talk with your doctor before you increase the amount of fluids you drink. Eat plenty of fiber each day to avoid constipation. Include foods such as whole-grain breads and cereals, raw vegetables, raw and dried fruits, and beans. · If you have hemorrhoids or swelling or pain around the opening of your vagina, try using cold and heat. You can put ice or a cold pack on the area for 10 to 20 minutes at a time. Put a thin cloth between the ice and your skin. Also try sitting in a few inches of warm water (sitz bath) 3 times a day and after bowel movements. When should you call for help? Call 911 anytime you think you may need emergency care. For example, call if: 
You are thinking of hurting yourself, your baby, or anyone else. You have sudden, severe pain in your belly. You passed out (lost consciousness). Call your doctor now or seek immediate medical care if: 
You have severe vaginal bleeding. You are passing blood clots and soaking through a pad each hour for 2 or more hours.  
Your vaginal bleeding seems to be getting heavier or is still bright red 4 days after delivery, or you pass blood clots larger than the size of a golf ball. You are dizzy or lightheaded, or you feel like you may faint. You are vomiting or cannot keep fluids down. You have a fever. You have new or more belly pain. You pass tissue (not just blood). Your vaginal discharge smells bad. Your belly feels tender or full and hard. Your breasts are continuously painful or red. You feel sad, anxious, or hopeless for more than a few days. Watch closely for changes in your health, and be sure to contact your doctor if you have any problems. Where can you learn more? Go to [email or web address] Enter L511  in the search box to learn more about \"Postpartum Care: After Your Visit\". or 
 
Go to [email or web address] Enter O128  in the search box to learn more about \"Vaginal Childbirth: After Your Visit\". © 5767-4701 Healthwise, [email or web address] ([employer]). Care instructions adapted under license by Willem Egan (which disclaims liability or warranty for this information). This care instruction is for use with your licensed healthcare professional. If you have questions about a medical condition or this instruction, always ask your healthcare professional. Hannah Eng any warranty or liability for your use of this information. Follow up with [facility] in 2 weeks; call to schedule appointment Discharge Orders None [email or web address] Announcement We are excited to announce that we are making your provider's discharge notes available to you in NoDaysOff. You will see these notes when they are completed and signed by the physician that discharged you from your recent hospital stay. If you have any questions or concerns about any information you see in NoDaysOff, please call the Health Information Department where you were seen or reach out to your Primary Care Provider for more information about your plan of care. Introducing hospitals & [facility] SERVICES! Dakota Brandt introduces [employer] patient portal. Now you can access parts of your medical record, email your doctor's office, and request medication refills online. 1. In your internet browser, go to [email or web address] 2. Click on the First Time User? Click Here link in the Sign In box. You will see the New Member Sign Up page. 3. Enter your [employer] Access Code exactly as it appears below. You will not need to use this code after youve completed the sign-up process. If you do not sign up before the expiration date, you must request a new code. · [employer] Access Code: [email or web address] Expires: 1/6/2018 11:56 AM 
 
4. Enter the last four digits of your Social Security Number (xxxx) and Date of Birth (mm/dd/yyyy) as indicated and click Submit. You will be taken to the next sign-up page. 5. Create a [employer] ID. This will be your [employer] login ID and cannot be changed, so think of one that is secure and easy to remember. 6. Create a [employer] password. You can change your password at any time. 7. Enter your Password Reset Question and Answer. This can be used at a later time if you forget your password. 8. Enter your e-mail address. You will receive e-mail notification when new information is available in [address] 9. Click Sign Up. You can now view and download portions of your medical record. 10. Click the Download Summary menu link to download a portable copy of your medical information. If you have questions, please visit the Frequently Asked Questions section of the [employer] website. Remember, [employer] is NOT to be used for urgent needs. For medical emergencies, dial 911. Now available from your iPhone and Android! General Information Please provide this summary of care documentation to your next provider. Patient Signature:  ____________________________________________________________ Date:  ____________________________________________________________  
  
Alyse Neversink Provider Signature:  ____________________________________________________________ Date:  ____________________________________________________________

## 2017-10-06 ENCOUNTER — ANESTHESIA EVENT (OUTPATIENT)
Dept: LABOR AND DELIVERY | Age: 27
End: 2017-10-06
Payer: COMMERCIAL

## 2017-10-06 ENCOUNTER — ANESTHESIA (OUTPATIENT)
Dept: LABOR AND DELIVERY | Age: 27
End: 2017-10-06
Payer: COMMERCIAL

## 2017-10-06 PROCEDURE — 0KQM0ZZ REPAIR PERINEUM MUSCLE, OPEN APPROACH: ICD-10-PCS | Performed by: OBSTETRICS & GYNECOLOGY

## 2017-10-06 PROCEDURE — 74011250636 HC RX REV CODE- 250/636: Performed by: ANESTHESIOLOGY

## 2017-10-06 PROCEDURE — 74011250636 HC RX REV CODE- 250/636: Performed by: OBSTETRICS & GYNECOLOGY

## 2017-10-06 PROCEDURE — 76815 OB US LIMITED FETUS(S): CPT

## 2017-10-06 PROCEDURE — 77030014125 HC TY EPDRL BBMI -B: Performed by: ANESTHESIOLOGY

## 2017-10-06 PROCEDURE — 75410000002 HC LABOR FEE PER 1 HR

## 2017-10-06 PROCEDURE — 75410000003 HC RECOV DEL/VAG/CSECN EA 0.5 HR

## 2017-10-06 PROCEDURE — 00HU33Z INSERTION OF INFUSION DEVICE INTO SPINAL CANAL, PERCUTANEOUS APPROACH: ICD-10-PCS | Performed by: ANESTHESIOLOGY

## 2017-10-06 PROCEDURE — 76060000078 HC EPIDURAL ANESTHESIA

## 2017-10-06 PROCEDURE — 74011000250 HC RX REV CODE- 250

## 2017-10-06 PROCEDURE — 74011250636 HC RX REV CODE- 250/636

## 2017-10-06 PROCEDURE — 75410000000 HC DELIVERY VAGINAL/SINGLE

## 2017-10-06 PROCEDURE — 0DQR0ZZ REPAIR ANAL SPHINCTER, OPEN APPROACH: ICD-10-PCS | Performed by: OBSTETRICS & GYNECOLOGY

## 2017-10-06 PROCEDURE — 77030021125

## 2017-10-06 PROCEDURE — 65410000002 HC RM PRIVATE OB

## 2017-10-06 PROCEDURE — 77030007880 HC KT SPN EPDRL BBMI -B

## 2017-10-06 RX ORDER — SODIUM CHLORIDE 0.9 % (FLUSH) 0.9 %
5-10 SYRINGE (ML) INJECTION AS NEEDED
Status: DISCONTINUED | OUTPATIENT
Start: 2017-10-06 | End: 2017-10-06 | Stop reason: HOSPADM

## 2017-10-06 RX ORDER — OXYTOCIN/RINGER'S LACTATE 20/1000 ML
PLASTIC BAG, INJECTION (ML) INTRAVENOUS
Status: COMPLETED
Start: 2017-10-06 | End: 2017-10-07

## 2017-10-06 RX ORDER — CHLOROPROCAINE HYDROCHLORIDE 30 MG/ML
INJECTION, SOLUTION EPIDURAL; INFILTRATION; INTRACAUDAL; PERINEURAL
Status: DISCONTINUED
Start: 2017-10-06 | End: 2017-10-06 | Stop reason: HOSPADM

## 2017-10-06 RX ORDER — BUPIVACAINE HYDROCHLORIDE AND EPINEPHRINE 2.5; 5 MG/ML; UG/ML
INJECTION, SOLUTION EPIDURAL; INFILTRATION; INTRACAUDAL; PERINEURAL AS NEEDED
Status: DISCONTINUED | OUTPATIENT
Start: 2017-10-06 | End: 2017-10-06 | Stop reason: HOSPADM

## 2017-10-06 RX ORDER — BUPIVACAINE HYDROCHLORIDE 5 MG/ML
INJECTION, SOLUTION EPIDURAL; INTRACAUDAL AS NEEDED
Status: DISCONTINUED | OUTPATIENT
Start: 2017-10-06 | End: 2017-10-06 | Stop reason: HOSPADM

## 2017-10-06 RX ORDER — FENTANYL CITRATE 50 UG/ML
INJECTION, SOLUTION INTRAMUSCULAR; INTRAVENOUS AS NEEDED
Status: DISCONTINUED | OUTPATIENT
Start: 2017-10-06 | End: 2017-10-06 | Stop reason: HOSPADM

## 2017-10-06 RX ORDER — CHLOROPROCAINE HYDROCHLORIDE 30 MG/ML
INJECTION, SOLUTION EPIDURAL; INFILTRATION; INTRACAUDAL; PERINEURAL AS NEEDED
Status: DISCONTINUED | OUTPATIENT
Start: 2017-10-06 | End: 2017-10-06 | Stop reason: HOSPADM

## 2017-10-06 RX ORDER — FENTANYL/BUPIVACAINE/NS/PF 2-1250MCG
1-16 PREFILLED PUMP RESERVOIR EPIDURAL CONTINUOUS
Status: DISCONTINUED | OUTPATIENT
Start: 2017-10-06 | End: 2017-10-06 | Stop reason: HOSPADM

## 2017-10-06 RX ORDER — BUPIVACAINE HYDROCHLORIDE AND EPINEPHRINE 2.5; 5 MG/ML; UG/ML
INJECTION, SOLUTION EPIDURAL; INFILTRATION; INTRACAUDAL; PERINEURAL
Status: DISCONTINUED
Start: 2017-10-06 | End: 2017-10-06 | Stop reason: HOSPADM

## 2017-10-06 RX ORDER — FENTANYL/BUPIVACAINE/NS/PF 2-1250MCG
PREFILLED PUMP RESERVOIR EPIDURAL
Status: COMPLETED
Start: 2017-10-06 | End: 2017-10-06

## 2017-10-06 RX ORDER — SODIUM CHLORIDE 0.9 % (FLUSH) 0.9 %
5-10 SYRINGE (ML) INJECTION EVERY 8 HOURS
Status: DISCONTINUED | OUTPATIENT
Start: 2017-10-06 | End: 2017-10-06 | Stop reason: HOSPADM

## 2017-10-06 RX ORDER — LIDOCAINE HYDROCHLORIDE AND EPINEPHRINE 20; 5 MG/ML; UG/ML
INJECTION, SOLUTION EPIDURAL; INFILTRATION; INTRACAUDAL; PERINEURAL
Status: DISCONTINUED
Start: 2017-10-06 | End: 2017-10-06 | Stop reason: HOSPADM

## 2017-10-06 RX ORDER — BUPIVACAINE HYDROCHLORIDE 5 MG/ML
INJECTION, SOLUTION EPIDURAL; INTRACAUDAL
Status: COMPLETED
Start: 2017-10-06 | End: 2017-10-06

## 2017-10-06 RX ORDER — OXYTOCIN IN 5 % DEXTROSE 30/500 ML
1-25 PLASTIC BAG, INJECTION (ML) INTRAVENOUS
Status: DISCONTINUED | OUTPATIENT
Start: 2017-10-06 | End: 2017-10-06 | Stop reason: HOSPADM

## 2017-10-06 RX ORDER — OXYTOCIN IN 5 % DEXTROSE 30/500 ML
PLASTIC BAG, INJECTION (ML) INTRAVENOUS
Status: DISPENSED
Start: 2017-10-06 | End: 2017-10-06

## 2017-10-06 RX ORDER — FENTANYL CITRATE 50 UG/ML
INJECTION, SOLUTION INTRAMUSCULAR; INTRAVENOUS
Status: DISCONTINUED
Start: 2017-10-06 | End: 2017-10-06 | Stop reason: HOSPADM

## 2017-10-06 RX ORDER — METHYLERGONOVINE MALEATE 0.2 MG/ML
INJECTION INTRAVENOUS
Status: DISPENSED
Start: 2017-10-06 | End: 2017-10-07

## 2017-10-06 RX ORDER — FENTANYL CITRATE 50 UG/ML
INJECTION, SOLUTION INTRAMUSCULAR; INTRAVENOUS
Status: DISPENSED
Start: 2017-10-06 | End: 2017-10-06

## 2017-10-06 RX ORDER — BUPIVACAINE HYDROCHLORIDE AND EPINEPHRINE 2.5; 5 MG/ML; UG/ML
INJECTION, SOLUTION EPIDURAL; INFILTRATION; INTRACAUDAL; PERINEURAL
Status: DISPENSED
Start: 2017-10-06 | End: 2017-10-06

## 2017-10-06 RX ADMIN — FENTANYL 0.2 MG/100ML-BUPIV 0.125%-NACL 0.9% EPIDURAL INJ 12 ML/HR: 2/0.125 SOLUTION at 03:58

## 2017-10-06 RX ADMIN — FENTANYL CITRATE 100 MCG: 50 INJECTION, SOLUTION INTRAMUSCULAR; INTRAVENOUS at 03:12

## 2017-10-06 RX ADMIN — PENICILLIN G POTASSIUM 2.5 MILLION UNITS: 20000000 POWDER, FOR SOLUTION INTRAVENOUS at 20:05

## 2017-10-06 RX ADMIN — BUPIVACAINE HYDROCHLORIDE 5 ML: 5 INJECTION, SOLUTION EPIDURAL; INTRACAUDAL at 13:30

## 2017-10-06 RX ADMIN — FENTANYL 0.2 MG/100ML-BUPIV 0.125%-NACL 0.9% EPIDURAL INJ 12 ML/HR: 2/0.125 SOLUTION at 11:13

## 2017-10-06 RX ADMIN — SODIUM CHLORIDE, SODIUM LACTATE, POTASSIUM CHLORIDE, AND CALCIUM CHLORIDE 500 ML/HR: 600; 310; 30; 20 INJECTION, SOLUTION INTRAVENOUS at 13:52

## 2017-10-06 RX ADMIN — PENICILLIN G POTASSIUM 2.5 MILLION UNITS: 20000000 POWDER, FOR SOLUTION INTRAVENOUS at 01:41

## 2017-10-06 RX ADMIN — CHLOROPROCAINE HYDROCHLORIDE 5 ML: 30 INJECTION, SOLUTION EPIDURAL; INFILTRATION; INTRACAUDAL; PERINEURAL at 15:02

## 2017-10-06 RX ADMIN — Medication 1 MILLI-UNITS/MIN: at 02:15

## 2017-10-06 RX ADMIN — SODIUM CHLORIDE, SODIUM LACTATE, POTASSIUM CHLORIDE, AND CALCIUM CHLORIDE 999 ML/HR: 600; 310; 30; 20 INJECTION, SOLUTION INTRAVENOUS at 02:56

## 2017-10-06 RX ADMIN — BUPIVACAINE HYDROCHLORIDE AND EPINEPHRINE 0.8 ML: 2.5; 5 INJECTION, SOLUTION EPIDURAL; INFILTRATION; INTRACAUDAL; PERINEURAL at 03:09

## 2017-10-06 RX ADMIN — PENICILLIN G POTASSIUM 2.5 MILLION UNITS: 20000000 POWDER, FOR SOLUTION INTRAVENOUS at 04:31

## 2017-10-06 RX ADMIN — BUPIVACAINE HYDROCHLORIDE AND EPINEPHRINE 3 ML: 2.5; 5 INJECTION, SOLUTION EPIDURAL; INFILTRATION; INTRACAUDAL; PERINEURAL at 03:12

## 2017-10-06 RX ADMIN — BUPIVACAINE HYDROCHLORIDE 5 ML: 5 INJECTION, SOLUTION EPIDURAL; INTRACAUDAL at 13:36

## 2017-10-06 RX ADMIN — BUPIVACAINE HYDROCHLORIDE AND EPINEPHRINE 3 ML: 2.5; 5 INJECTION, SOLUTION EPIDURAL; INFILTRATION; INTRACAUDAL; PERINEURAL at 03:10

## 2017-10-06 RX ADMIN — SODIUM CHLORIDE, SODIUM LACTATE, POTASSIUM CHLORIDE, AND CALCIUM CHLORIDE 125 ML/HR: 600; 310; 30; 20 INJECTION, SOLUTION INTRAVENOUS at 07:12

## 2017-10-06 RX ADMIN — SODIUM CHLORIDE, SODIUM LACTATE, POTASSIUM CHLORIDE, AND CALCIUM CHLORIDE 125 ML/HR: 600; 310; 30; 20 INJECTION, SOLUTION INTRAVENOUS at 13:32

## 2017-10-06 RX ADMIN — PENICILLIN G POTASSIUM 2.5 MILLION UNITS: 20000000 POWDER, FOR SOLUTION INTRAVENOUS at 16:55

## 2017-10-06 RX ADMIN — FENTANYL 0.2 MG/100ML-BUPIV 0.125%-NACL 0.9% EPIDURAL INJ 10 ML/HR: 2/0.125 SOLUTION at 17:01

## 2017-10-06 RX ADMIN — PENICILLIN G POTASSIUM 2.5 MILLION UNITS: 20000000 POWDER, FOR SOLUTION INTRAVENOUS at 08:08

## 2017-10-06 RX ADMIN — PENICILLIN G POTASSIUM 2.5 MILLION UNITS: 20000000 POWDER, FOR SOLUTION INTRAVENOUS at 11:58

## 2017-10-06 NOTE — PROGRESS NOTES
Dr Howard Saini in to see patient. POC discussed to try nesocaine in epidural. If that doesn't work he may need to replace the epidural. jmj  1519 Left side with peanut ball . Variables returned. Attempting to rotate from OP position. 18 Dr Howard Saini in redose with 3% nesocaine. 0 Dr Howard Saini in for epid. Iv infiltrated. Replaced to left hand.  Lydia Hess

## 2017-10-06 NOTE — PROGRESS NOTES
[facility] - cervidil fell out    Visit Vitals    /80    Pulse 88    Temp 97.9 °F (36.6 °C)    Resp 18    Ht 5' 8\" (1.727 m)    Wt 99.8 kg (220 lb)    SpO2 97%    Breastfeeding Yes    BMI 33.45 kg/m2     Cvx: unchanged and patient intolerant of exam  [place]: Q2-5 min  FHR: 130, category 1    Will start pitocin

## 2017-10-06 NOTE — PROGRESS NOTES
Mild variables resolved with position change. Category 1 tracing now. sve 7/90/-1. IUPC placed. Continue pitocin augmentation of labor.

## 2017-10-06 NOTE — PROGRESS NOTES
Assumed care of  EGA 40 5/7 weeks admitted for early labor . Pt came in thinking that her water broke at 0730 on 10/6/17 but no additional leaking of fluid noted by staff. This morning at 0645 SROM for large amount of fluid noted. Pt denies any complications with this pregnancy. Pt is GBS+ and has been treated x 6 already.

## 2017-10-06 NOTE — PROGRESS NOTES
Assumed care. Intro. Done. Poc explained. No complaint at this time. Family at bedside, visiting.    2000. Assess, done. poc explained. Pen G given.     2120. Dr. Juan Miguel Fountain at bedside. View strip. Fetus very active. sve done. 2 cm/ post.    2130. Dr. Juan Miguel Fountain at bedside. View strip poc explained. Ultrasound done. Vtx. Presentation and fluid check done.    2152. poc explained. ambien given with instr. siderails up x 2 and call light within reach. 0255. Assumed care. 8850. Anes. At bedside for epid. Pt. Sitting at bedside. poc explained. Epid. Time-out done. 0320/ Repositioned. Sf. No complaint at this time. 0335. poc explained. Guerra cath insrted in sterile fashion. Vandana. Well. pericare done. Underpad changed. No fluid noted at this time. Fetus is very active. 0350. Repositioned left lat.    0400. Resting quietly in bed with eyes closed, asleep. 0430.   Med. give

## 2017-10-06 NOTE — PROGRESS NOTES
Pt. Still rating her pain in the posterior portion of her back and her butt a 10/10. No relief from rebolus or increasing the continuous rate. Dr Denny Magana called to assess.

## 2017-10-06 NOTE — PROGRESS NOTES
Pt stated that cervidil fell out when she went to the BR. Lashon Neal RN at bedside    0120 Dr. John Paul Diaz at bedside, aware cervidil fell out. SVE and pt unable to tolerate. Plans to start Pitocin.    0300 Update on patient given to RICHARD Miller RN.

## 2017-10-06 NOTE — H&P
EDC:10/01/2017  EGA: 40 weeks, 4 days      Primary Provider:  Esperanza Segura MD    CC:  Leaking Fluid. History of Present Illness:  Patient presents to L&D with complaint of SROM 0730   clear fluid  No complaint of vaginal bleeding. Active FM  No regular contractions   Uncomplicated prenatal care followed by Dr Kaushik Boogie   GBS+    penicillin started on arrival       Patient's Prenatal Care with Doctor of Record Esperanza Segura MD Notable For -    GBS positive RX in labor  RH negative rhogam 28 wks __given   Asthma pregnant   lab screening  Normal pregnancy primigravida          Impression & Recommendations:    Problem # 1:  Normal pregnancy primigravida (ICD-V22.1) (BSW35-N58.03)  admti with SROM   CBC, STBB   pcn for GBS +  category 1 fetal tracing  Reactive NST   Irregular contractions  desires ambulation and intermittent monitoring   discussed augmentation if no progression of labor     Problem # 2:  GBS positive RX in labor (SFF-902.24) (PDD41-X65.82)  penicillin per protocol     Problem # 3:  RH negative rhogam 28 wks __given  (ICD-V07.2) (CKG59-B56.13)  noted. fetal screen post delivery          Past Medical History:     Reviewed history from 04/10/2014 and no changes required: Allergies-seasonal        Anxiety        Asthma        Migraines     Past Surgical History:     Reviewed history from 2016 and no changes required:        none    Family History Summary:      Reviewed history Last on 2017 and no changes required:10/05/2017      General Comments - FH:  Family history transferred to [address] [place] [address]       Social History:     Reviewed history from 2017 and no changes required:        -5yrs ()        Works at [employer]                Smoking History:        Patient has never smoked.         Review of Systems        See HPI    Except as noted in the HPI, the review of systems is negative for General, Breast, , CV, Resp, GI, Endo, MS, Derm, Neuro, Psych, Eyes, ENT, Allergy and Heme. Allergies    ASPIRIN (Moderate)  DEMEROL (Moderate)      Medications Removed from Medication List        Flowsheet View for Follow-up Visit     Estimated weeks of        gestation:  40 4/7     Blood pressure: 133 / 79     Fetal position:  vertex     Cx Dilation:  2cm     Cx Effacement: 70%     Cx Station:  -2      Vital Signs    Blood Pressure: 133 / 79  Temperature:  98.7 deg.  F.  Pulse rate: 87 / minute  Resp rate: 16 / minute          Physical Exam     General           General appearance:  no acute distress    Head           Inspection:   normal    Eyes           External:   EOM intact    ENT           Dental:   adequate dentition    Chest           Lungs:  clear to auscultation          Heart:  regular rate and rhythm    Extremeties           Extremeties:  0 edema    Neurological           Reflexes:  2+ and symmetric with no pathological reflexes    Psych           Orientation:  oriented to time, place, and person          Mood:  no appearance of anxiety, depression, or agitation    Lymph           Inguinal:  no inguinal adenopathy    Skin           Inspection:  no rashes, suspicious lesions, or ulcerations    Abdomen           Abdomen:  gravid          EFW:  8lb    Pelvic Exam           EGBUS:  no lesions          Vagina:  normal appearing without lesions or discharge          Uterus:  gravid          Cervix:  no lesions or discharge                  Dilation: : 2cm                  Effacement:  70%                  Station:  -2                  Presentation:  vertex                  Membranes:  clear            Impression & Recommendations:    Problem # 1:  Normal pregnancy primigravida (ICD-V22.1) (XXY04-K21.03)  admti with SROM   CBC, STBB   pcn for GBS +  category 1 fetal tracing  Reactive NST   Irregular contractions  desires ambulation and intermittent monitoring   discussed augmentation if no progression of labor     Problem # 2:  GBS positive RX in labor (NJL-884.20) (OFH57-J73.44)  penicillin per protocol     Problem # 3:  RH negative rhogam 28 wks __given 7/12 (ICD-V07.2) (HLY34-I16.13)  noted. fetal screen post delivery      Medications (at conclusion of this visit)    01/31/2017 PRENATAL MULTI +DHA 27-0.8-228 MG ORAL CAPS (PRENATAL VIT-FE FUM-FA-OMEGA) one by mouth daily  07/13/2015 ZYRTEC ALLERGY TABS (CETIRIZINE HCL TABS)           LABORATORY DATA   TEST DATE RESULT   Group B Strep culture 09/08/2017 Positive                                   (Group B Strep Culture Result Field)   Blood Type 02/17/2017 B                                             (Blood Type Result Field)   Rh 02/17/2017 Negative                                   (Rh Result Field)   Rhogam Inj Given 07/12/2017 Full dose   Tdap Vaccine Given 07/12/2017 Vacc.  [address]   Antibody Screen 07/12/2017 Negative   Rubella  [facility] Reference Ranges On or After 3/10/14                  <0.90              Non-immune      0.90 - 0.99     Equivocal      >0.99              Immune    [facility] Reference Ranges  Before 3/10/14           <5                 Non-immune             5 - 9               Equivocal            >9                 Immune  [facility] Reference Ranges       < Or = 0.90       Negative             0.91-1.09          Equivocal            > Or = 1.10       Positive   02/17/2017     2.47     TPA (T Pallidum Antibodies) 07/12/2017 Negative   Serology (RPR)     HBsAg 02/17/2017 Negative   HIV 02/17/2017 Non Reactive   Hemoglobin 07/12/2017 12.0   Hematocrit 07/12/2017 35.3   Platelets 40/34/1170 197 X10E3/UL   TSH 06/16/2010 3.290   Urine Culture 02/17/2017 Negative   GC DNA Probe 02/17/2017 Negative   Chlamydia DNA 02/17/2017 Negative   PAP 02/17/2017 NIL   Flu Vaccine Given 09/28/2017 accepted   HGBA1C     HGB Electro     T4, Free     BG Fasting     GTT 1H 50G 07/12/2017 104   GTT 1H 100G     GTT 2H 100G     GTT 3H 100G     Glucose Plasma     CF Accept or Decline 02/17/2017 Declined   CF Screen Result 02/17/2017 Declined   Nuchal Trans 05/19/2017 3.84^3. 84 mm&millimeters   AFP Only     Tetra 04/12/2017 *Screen Negative*   AFP Serum 03/15/2017 declined   CVS     AFP Amniotic     Amnio Karyo     FISH     GC Culture     Chlamydia Cult     Ureaplasma     Mycoplasma     WBC 02/17/2017 9.2 X10E3/UL   RBC 02/17/2017 4.57 X10E6/UL   MCV 02/17/2017 86   MCH 02/17/2017 29.3   MCHC RBC 02/17/2017 33.9     ULTRASOUND DATA   TEST DATE RESULT   Estimated Fetal Weight 05/19/2017 878.09068899^826 g&grams                                     Weight % 05/19/2017 90^90% %&percent                                                ALLAN                      BPP     Cervical Length (mm)         Date of Admission Update:  Vladimir Painter was seen and examined. History and physical has been reviewed. The patient has been examined.  There have been no significant clinical changes since the completion of the originally dated History and Physical.    Signed By: Ervin Abreu MD     October 5, 2017 9:54 PM

## 2017-10-06 NOTE — PROGRESS NOTES
Tolerating ctx.    Visit Vitals    /80    Pulse 88    Temp 97.9 °F (36.6 °C)    Resp 18    Ht 5' 8\" (1.727 m)    Wt 99.8 kg (220 lb)    SpO2 97%    Breastfeeding Yes    BMI 33.45 kg/m2     Cvx: 2/70/-2, posterior  [place]: Q4-6 min  FHR: 145, category 1    Bedside US: ALLAN 11.3cm, vertex, abundant fetal movement    Adequately treated for GBS(+)  Discussed cervical ripening  Cervidil placed

## 2017-10-06 NOTE — ANESTHESIA PROCEDURE NOTES
Epidural Block    Start time: 10/6/2017 3:02 AM  End time: 10/6/2017 3:14 AM  Performed by: Lobo Weiner  Authorized by: Lobo Weiner     Pre-Procedure  Indication: labor epidural    Preanesthetic Checklist: risks and benefits discussed, site marked and timeout performed    Timeout Time: 03:02        Epidural:   Patient position:  Seated  Prep region:  Lumbar  Prep: DuraPrep    Location:  L3-4    Needle and Epidural Catheter:   Needle Type:  Tuohy  Needle Gauge:  17 G  Injection Technique:  Loss of resistance using saline  Attempts:  1  Catheter Size:  19 G  Catheter at Skin Depth (cm):  10  Depth in Epidural Space (cm):  4  Events: no blood with aspiration, no cerebrospinal fluid with aspiration, no paresthesia and negative aspiration test    Test Dose:  Bupivacaine 0.25% w/ epi and negative    Assessment:   Catheter Secured:  Tegaderm and tape  Insertion:  Uncomplicated  Patient tolerance:  Patient tolerated the procedure well with no immediate complications  Spinal portion:    A 25 g pencil point spinal needle was placed through the [place] x1 attempt until CSF was obtained. 0.8 mL 0.25% bupivacaine with 1:200K epinephrine was deposited into the CSF. -paresthesia.

## 2017-10-06 NOTE — PROGRESS NOTES
Large gush of clear fluid at 0645. Comfortable with epidural.     Visit Vitals    /70    Pulse 90    Temp 99 °F (37.2 °C)    Resp 18    Ht 5' 8\" (1.727 m)    Wt 99.8 kg (220 lb)    SpO2 99%    Breastfeeding Yes    BMI 33.45 kg/m2     Cvx: 4/80/-1  [place]: Q3 min  FHR: 140s, category 1, early decelerations    Continue pitocin.

## 2017-10-07 LAB
BASOPHILS # BLD: 0 K/UL (ref 0–0.1)
BASOPHILS NFR BLD: 0 % (ref 0–1)
EOSINOPHIL # BLD: 0 K/UL (ref 0–0.4)
EOSINOPHIL NFR BLD: 0 % (ref 0–7)
ERYTHROCYTE [DISTWIDTH] IN BLOOD BY AUTOMATED COUNT: 12.9 % (ref 11.5–14.5)
HCT VFR BLD AUTO: 26.2 % (ref 35–47)
HGB BLD-MCNC: 9.3 G/DL (ref 11.5–16)
LYMPHOCYTES # BLD: 2 K/UL (ref 0.8–3.5)
LYMPHOCYTES NFR BLD: 10 % (ref 12–49)
MCH RBC QN AUTO: 31.1 PG (ref 26–34)
MCHC RBC AUTO-ENTMCNC: 35.5 G/DL (ref 30–36.5)
MCV RBC AUTO: 87.6 FL (ref 80–99)
MONOCYTES # BLD: 1.1 K/UL (ref 0–1)
MONOCYTES NFR BLD: 6 % (ref 5–13)
NEUTS SEG # BLD: 16.6 K/UL (ref 1.8–8)
NEUTS SEG NFR BLD: 84 % (ref 32–75)
PLATELET # BLD AUTO: 173 K/UL (ref 150–400)
RBC # BLD AUTO: 2.99 M/UL (ref 3.8–5.2)
WBC # BLD AUTO: 19.7 K/UL (ref 3.6–11)

## 2017-10-07 PROCEDURE — 74011250637 HC RX REV CODE- 250/637: Performed by: OBSTETRICS & GYNECOLOGY

## 2017-10-07 PROCEDURE — 85025 COMPLETE CBC W/AUTO DIFF WBC: CPT | Performed by: OBSTETRICS & GYNECOLOGY

## 2017-10-07 PROCEDURE — 86900 BLOOD TYPING SEROLOGIC ABO: CPT | Performed by: OBSTETRICS & GYNECOLOGY

## 2017-10-07 PROCEDURE — 77030009363 HC CUP VAC EXTRCT CNCI -B

## 2017-10-07 PROCEDURE — 65410000002 HC RM PRIVATE OB

## 2017-10-07 PROCEDURE — 36415 COLL VENOUS BLD VENIPUNCTURE: CPT | Performed by: OBSTETRICS & GYNECOLOGY

## 2017-10-07 PROCEDURE — 85461 HEMOGLOBIN FETAL: CPT | Performed by: OBSTETRICS & GYNECOLOGY

## 2017-10-07 PROCEDURE — 77030031139 HC SUT VCRL2 J&J -A

## 2017-10-07 PROCEDURE — 74011250636 HC RX REV CODE- 250/636

## 2017-10-07 RX ORDER — ACETAMINOPHEN 325 MG/1
650 TABLET ORAL
Status: DISCONTINUED | OUTPATIENT
Start: 2017-10-07 | End: 2017-10-08 | Stop reason: HOSPADM

## 2017-10-07 RX ORDER — OXYCODONE AND ACETAMINOPHEN 5; 325 MG/1; MG/1
2 TABLET ORAL
Status: DISCONTINUED | OUTPATIENT
Start: 2017-10-07 | End: 2017-10-08 | Stop reason: HOSPADM

## 2017-10-07 RX ORDER — POLYETHYLENE GLYCOL 3350 17 G/17G
17 POWDER, FOR SOLUTION ORAL
Status: DISCONTINUED | OUTPATIENT
Start: 2017-10-07 | End: 2017-10-08 | Stop reason: HOSPADM

## 2017-10-07 RX ORDER — OXYCODONE AND ACETAMINOPHEN 5; 325 MG/1; MG/1
1 TABLET ORAL
Status: DISCONTINUED | OUTPATIENT
Start: 2017-10-07 | End: 2017-10-07

## 2017-10-07 RX ORDER — ZOLPIDEM TARTRATE 5 MG/1
5 TABLET ORAL
Status: DISCONTINUED | OUTPATIENT
Start: 2017-10-07 | End: 2017-10-08 | Stop reason: HOSPADM

## 2017-10-07 RX ORDER — DOCUSATE SODIUM 100 MG/1
100 CAPSULE, LIQUID FILLED ORAL DAILY
Status: DISCONTINUED | OUTPATIENT
Start: 2017-10-07 | End: 2017-10-08 | Stop reason: HOSPADM

## 2017-10-07 RX ORDER — NALOXONE HYDROCHLORIDE 0.4 MG/ML
0.4 INJECTION, SOLUTION INTRAMUSCULAR; INTRAVENOUS; SUBCUTANEOUS AS NEEDED
Status: DISCONTINUED | OUTPATIENT
Start: 2017-10-07 | End: 2017-10-08 | Stop reason: HOSPADM

## 2017-10-07 RX ORDER — HYDROMORPHONE HYDROCHLORIDE 2 MG/1
2 TABLET ORAL
Status: DISCONTINUED | OUTPATIENT
Start: 2017-10-07 | End: 2017-10-08 | Stop reason: HOSPADM

## 2017-10-07 RX ORDER — OXYTOCIN/RINGER'S LACTATE 20/1000 ML
125-500 PLASTIC BAG, INJECTION (ML) INTRAVENOUS ONCE
Status: COMPLETED | OUTPATIENT
Start: 2017-10-07 | End: 2017-10-07

## 2017-10-07 RX ORDER — HYDROCORTISONE ACETATE PRAMOXINE HCL 2.5; 1 G/100G; G/100G
CREAM TOPICAL AS NEEDED
Status: DISCONTINUED | OUTPATIENT
Start: 2017-10-07 | End: 2017-10-08 | Stop reason: HOSPADM

## 2017-10-07 RX ADMIN — OXYCODONE HYDROCHLORIDE AND ACETAMINOPHEN 2 TABLET: 5; 325 TABLET ORAL at 22:05

## 2017-10-07 RX ADMIN — POLYETHYLENE GLYCOL 3350 17 G: 17 POWDER, FOR SOLUTION ORAL at 12:01

## 2017-10-07 RX ADMIN — Medication 2500 MILLI-UNITS/HR: at 00:00

## 2017-10-07 RX ADMIN — HYDROMORPHONE HYDROCHLORIDE 2 MG: 2 TABLET ORAL at 11:37

## 2017-10-07 RX ADMIN — HYDROMORPHONE HYDROCHLORIDE 2 MG: 2 TABLET ORAL at 15:36

## 2017-10-07 RX ADMIN — OXYCODONE HYDROCHLORIDE AND ACETAMINOPHEN 2 TABLET: 5; 325 TABLET ORAL at 04:30

## 2017-10-07 RX ADMIN — OXYCODONE HYDROCHLORIDE AND ACETAMINOPHEN 1 TABLET: 5; 325 TABLET ORAL at 00:51

## 2017-10-07 RX ADMIN — ACETAMINOPHEN 650 MG: 325 TABLET ORAL at 18:03

## 2017-10-07 RX ADMIN — DOCUSATE SODIUM 100 MG: 100 CAPSULE, LIQUID FILLED ORAL at 09:11

## 2017-10-07 RX ADMIN — ACETAMINOPHEN 650 MG: 325 TABLET ORAL at 09:11

## 2017-10-07 NOTE — PROGRESS NOTES
Pt requesting percocet, informed that she cant have that yet since tylenol is in percocet, will need to wait for 2 more hours. Will talk with provider about other options. Pt rating pain 5/10.

## 2017-10-07 NOTE — L&D DELIVERY NOTE
Delivery Summary  Patient: Sameera Hernandez             Additional Delivery Comments - Patient was admitted the evening prior to delivery with SROM. On admission, she was 2cm/-2 station. Augmentation of contractions was begun and epidural analgesia was placed. Fetal heart tones were Cat 1 and Cat 2 throughout the course of labor. The patient then progressively achieved more active progress through the first stage, then progressed  through the second stage to a vaginal delivery. When the fetal vertex +3 station the perinuem with  maternal pushing efforts, the FHT pattern demonstrated persistent recurrent late decelerations with progressive loss of variability and lack of fetal response to scalp stimulation. Recommendation was made to expedite delivery. Use of vacuum was discussed , patient indicated she understood and agreed to proceed. The patient was prepared for delivery. The KIWI vacuum was applied to the vertex without difficulty and with one contraction, one pull, the baby was delivered without difficulty over intact perineum. He bulb suctioned, became active and crying, and was placed on the maternal abdomen. The cord was clamped and cut, and then the placenta delivered spontaneously, intact. The fundus became firm, the cervix and sulci were inspected and appeared to be intact. A third degree perineal laceration with approximately 3cm left sulcus extension and superficial 2cm R sidewall laceration were repaired with 2-0 and 3-0 vicryl in layers. Vaginal exam revealed no evidence of hematoma formation or foreign bodies. Rectal exam revealed intact mucosa. Sponge and sharps count was correct. The procedure was tolerated adequately by the patient and she is recovering in stable condition.       Information for the patient's :  Emanuelguerrero Loving [[id number]]     Delivery Type: Vaginal, Vacuum (Extractor)   Delivery Date: 10/6/2017   Delivery Time: 10:39 PM     Birth Weight: 4.325 kg Sex:  male  Delivery Clinician:  Lia Coulter   Gestational Age: Gestational Age: 39w6d    Presentation: Vertex   Position: Middle  Occiput  Anterior     Apgars were 9  and 9      Resuscitation Method: Tactile Stimulation     Meconium Stained: None  Living Status: Living       Placenta Date/Time: 10/6 10:45 PM   Placenta Removal: Spontaneous   Placenta Appearance: Vertex [1]     Cord Information: 3 Vessels    Cord Events: None       Cord Blood Sent?:  Yes    Blood Gases Sent?:  No     Episiotomy: None   Laceration(s): Third degree perineal;Vaginal;Other (comment)     Estimated Blood Loss (ml): 700    Labor Events  Method: None      Augmentation: Oxytocin   Cervical Ripening: 10/6/2017  9:40 PM  Cervidil        Operative Vaginal Delivery - see delivery details above

## 2017-10-07 NOTE — ANESTHESIA POSTPROCEDURE EVALUATION
Post-Anesthesia Evaluation and Assessment    Patient: Liliane Ingram MRN: [id number]  SSN: [id number]    YOB: 1990  Age: 28 y.o. Sex: female       Cardiovascular Function/Vital Signs  Visit Vitals    /51    Pulse (!) 128    Temp 37.4 °C (99.3 °F)    Resp 18    Ht 5' 8\" (1.727 m)    Wt 99.8 kg (220 lb)    SpO2 100%    Breastfeeding Yes    BMI 33.45 kg/m2       Patient is status post spinal, epidural anesthesia for * No procedures listed *. Nausea/Vomiting: None    Postoperative hydration reviewed and adequate. Pain:  Pain Scale 1: Numeric (0 - 10) (10/06/17 2200)  Pain Intensity 1: 2 (10/06/17 2200)   Managed    Neurological Status:   Neuro (WDL): Within Defined Limits (10/06/17 1930)   At baseline    Mental Status and Level of Consciousness: Arousable    Pulmonary Status:   O2 Device: Non-invasive cannula (10/06/17 2000)   Adequate oxygenation and airway patent    Complications related to anesthesia: None    Post-anesthesia assessment completed.  No concerns    Signed By: Wayne Gongora MD     October 7, 2017

## 2017-10-07 NOTE — PROGRESS NOTES
Baby taken to nursery for circumcision. Upon examination of the genitalia before circumcision, he was noted to have the glans already partially exposed. Decision was made not to perform the circumcision and defer to Urology as an outpatient.     Mia Calabrese MD

## 2017-10-07 NOTE — PROGRESS NOTES
Bedside and Verbal shift change report given to KATINA Jain RN (oncoming nurse) by Sharri Jose RN (offgoing nurse). Report included the following information SBAR, Procedure Summary, Intake/Output, MAR and Recent Results.

## 2017-10-07 NOTE — PROGRESS NOTES
Post-Partum Day Number 1 Progress Note    Anujacristi Aguilar     Assessment: Doing well, post partum day 1  700cc PPH: immediate PPH from vaginal and perineal laceration, lochia currently normal, VS WNL, Hgb 13.4 to 9.3, continue to monitor    3rd degree perineal laceration: s/p repair. Bowel regimen ordered to avoid constipation    Rh neg: baby Rh positive, Rhogam ordered    Plan:  1. Continue routine postpartum and perineal care as well as maternal education. 2. The risks and benefits of the circumcision  procedure and anesthesia including: bleeding, infection, variability of cosmetic results were discussed at length with the mother. She is aware that future repeat procedures may be necessary. She gives informed consent to proceed as noted and her questions are answered. Information for the patient's :  Samuel Block [[id number]]   Vaginal, Vacuum (Extractor)    S: Patient doing well without significant complaint. Voiding without difficulty, normal lochia. She feels well but it tired. She denies feeling lightheaded or dizzy with ambulating. Vitals:  Visit Vitals    /63 (BP 1 Location: Right arm, BP Patient Position: Sitting)    Pulse 80    Temp 97.9 °F (36.6 °C)    Resp 16    Ht 5' 8\" (1.727 m)    Wt 99.8 kg (220 lb)    SpO2 100%    Breastfeeding Yes    BMI 33.45 kg/m2     Temp (24hrs), Av.9 °F (37.2 °C), Min:97.5 °F (36.4 °C), Max:100.3 °F (37.9 °C)        Exam:   Patient without distress. Abdomen soft, fundus firm, nontender                Perineum with normal lochia noted. Lower extremities are negative for cords or tenderness.  1+ b/l LE edema    Labs:     Lab Results   Component Value Date/Time    WBC 19.7 10/07/2017 07:54 AM    WBC 8.8 10/05/2017 09:33 AM    WBC 12.7 2017 05:39 AM    HGB 9.3 10/07/2017 07:54 AM    HGB 13.4 10/05/2017 09:33 AM    HGB 13.8 2017 05:39 AM    HCT 26.2 10/07/2017 07:54 AM    HCT 37.3 10/05/2017 09:33 AM    HCT 38.6 02/06/2017 05:39 AM    PLATELET 572 69/87/2773 07:54 AM    PLATELET 998 60/61/7398 09:33 AM    PLATELET 213 82/77/1761 05:39 AM       Recent Results (from the past 24 hour(s))   CBC WITH AUTOMATED DIFF    Collection Time: 10/07/17  7:54 AM   Result Value Ref Range    WBC 19.7 (H) 3.6 - 11.0 K/uL    RBC 2.99 (L) 3.80 - 5.20 M/uL    HGB 9.3 (L) 11.5 - 16.0 g/dL    HCT 26.2 (L) 35.0 - 47.0 %    MCV 87.6 80.0 - 99.0 FL    MCH 31.1 26.0 - 34.0 PG    MCHC 35.5 30.0 - 36.5 g/dL    RDW 12.9 11.5 - 14.5 %    PLATELET 130 455 - 263 K/uL    NEUTROPHILS 84 (H) 32 - 75 %    LYMPHOCYTES 10 (L) 12 - 49 %    MONOCYTES 6 5 - 13 %    EOSINOPHILS 0 0 - 7 %    BASOPHILS 0 0 - 1 %    ABS. NEUTROPHILS 16.6 (H) 1.8 - 8.0 K/UL    ABS. LYMPHOCYTES 2.0 0.8 - 3.5 K/UL    ABS. MONOCYTES 1.1 (H) 0.0 - 1.0 K/UL    ABS. EOSINOPHILS 0.0 0.0 - 0.4 K/UL    ABS.  BASOPHILS 0.0 0.0 - 0.1 K/UL

## 2017-10-08 VITALS
SYSTOLIC BLOOD PRESSURE: 126 MMHG | HEIGHT: 68 IN | BODY MASS INDEX: 33.34 KG/M2 | OXYGEN SATURATION: 100 % | WEIGHT: 220 LBS | DIASTOLIC BLOOD PRESSURE: 84 MMHG | RESPIRATION RATE: 18 BRPM | HEART RATE: 100 BPM | TEMPERATURE: 98.6 F

## 2017-10-08 PROCEDURE — 90471 IMMUNIZATION ADMIN: CPT

## 2017-10-08 PROCEDURE — 74011250637 HC RX REV CODE- 250/637: Performed by: OBSTETRICS & GYNECOLOGY

## 2017-10-08 PROCEDURE — 74011250636 HC RX REV CODE- 250/636: Performed by: OBSTETRICS & GYNECOLOGY

## 2017-10-08 PROCEDURE — 77030021125

## 2017-10-08 PROCEDURE — 90686 IIV4 VACC NO PRSV 0.5 ML IM: CPT | Performed by: OBSTETRICS & GYNECOLOGY

## 2017-10-08 RX ORDER — HYDROMORPHONE HYDROCHLORIDE 2 MG/1
2 TABLET ORAL
Qty: 30 TAB | Refills: 0 | Status: SHIPPED | OUTPATIENT
Start: 2017-10-08

## 2017-10-08 RX ORDER — LANOLIN ALCOHOL/MO/W.PET/CERES
325 CREAM (GRAM) TOPICAL 2 TIMES DAILY WITH MEALS
Qty: 60 TAB | Refills: 1 | Status: SHIPPED | OUTPATIENT
Start: 2017-10-08

## 2017-10-08 RX ORDER — POLYETHYLENE GLYCOL 3350 17 G/17G
17 POWDER, FOR SOLUTION ORAL
Qty: 30 PACKET | Refills: 1 | Status: SHIPPED | OUTPATIENT
Start: 2017-10-08

## 2017-10-08 RX ORDER — OXYCODONE AND ACETAMINOPHEN 5; 325 MG/1; MG/1
1 TABLET ORAL
Qty: 30 TAB | Refills: 0 | Status: SHIPPED | OUTPATIENT
Start: 2017-10-08

## 2017-10-08 RX ADMIN — DOCUSATE SODIUM 100 MG: 100 CAPSULE, LIQUID FILLED ORAL at 08:06

## 2017-10-08 RX ADMIN — INFLUENZA VIRUS VACCINE 0.5 ML: 15; 15; 15; 15 SUSPENSION INTRAMUSCULAR at 12:01

## 2017-10-08 RX ADMIN — OXYCODONE HYDROCHLORIDE AND ACETAMINOPHEN 2 TABLET: 5; 325 TABLET ORAL at 12:14

## 2017-10-08 RX ADMIN — HUMAN RHO(D) IMMUNE GLOBULIN 0.3 MG: 300 INJECTION, SOLUTION INTRAMUSCULAR at 10:10

## 2017-10-08 RX ADMIN — OXYCODONE HYDROCHLORIDE AND ACETAMINOPHEN 2 TABLET: 5; 325 TABLET ORAL at 02:46

## 2017-10-08 RX ADMIN — POLYETHYLENE GLYCOL 3350 17 G: 17 POWDER, FOR SOLUTION ORAL at 08:05

## 2017-10-08 NOTE — LACTATION NOTE
Couplet Interdisciplinary Rounds     MATERNAL    Daily Goal:     Influenza screening completed: On MAR   Tdap screening completed: YES   Rhogam Given:YES  MMR Given:N/A    VTE Prophylaxis: Not indicated, per Provider order    EPDS:            Patient Name: Darby Quezada Diagnosis: labor  Normal labor   Date of Admission: 10/5/2017 LOS: 3  Gestational Age: Gestational Age: <None>       Daily Goal:     Birth Weight: No birth weight on file.  Current Weight: Weight: 99.8 kg (220 lb)  % of Weight Change: Birth weight not on file    Feeding:   Metabolic Screen: YES    Hepatitis B:  YES    Discharge Bili:  YES  Car Seat Trial, if needed:  N/A      Patient/Family Teaching Needs:     Days before discharge: Ready for discharge    In Attendance:  Physician

## 2017-10-08 NOTE — DISCHARGE SUMMARY
Obstetrical Discharge Summary     Name: Nicholas Gamez MRN: [id number]  SSN: [id number]    YOB: 1990  Age: 28 y.o. Sex: female      Admit Date: 10/5/2017    Discharge Date: 10/8/2017     Admitting Physician: Colette Montiel MD     Attending Physician:  Delia Gtz MD     Admission Diagnoses: labor  Normal labor    Discharge Diagnoses:   Information for the patient's :  Yogesh Miranda [[id number]]   Delivery of a 4.325 kg male infant via Vaginal, Vacuum (Extractor) on 10/6/2017 at 10:39 PM  by . Apgars were 9 and 9. Additional Diagnoses:   Hospital Problems  Date Reviewed: 10/6/2017          Codes Class Noted POA    Normal labor ICD-10-CM: O80, Z37.9  ICD-9-CM: 833  10/5/2017 Unknown             Lab Results   Component Value Date/Time    Rubella, External immune 2017    GrBStrep, External positive 2017       Hospital Course: Normal hospital course following the delivery. She had an immediate postpartum hemorrhage from a deep third degree and vaginal sulcus laceration. This was repaired and she did well after delivery without symptoms of her acute blood loss anemia. Disposition at Discharge: Home or self care    Discharged Condition: Stable    Patient Instructions:   Current Discharge Medication List      START taking these medications    Details   HYDROmorphone (DILAUDID) 2 mg tablet Take 1 Tab by mouth every four (4) hours as needed for Pain. Max Daily Amount: 12 mg.  Qty: 30 Tab, Refills: 0      polyethylene glycol (MIRALAX) 17 gram packet Take 1 Packet by mouth daily as needed. Qty: 30 Packet, Refills: 1         CONTINUE these medications which have NOT CHANGED    Details   Cetirizine (ZYRTEC) 10 mg cap Take 10 mg by mouth.      prenatal multivit-ca-min-fe-fa tab Take  by mouth. ondansetron (ZOFRAN ODT) 4 mg disintegrating tablet Take 1 Tab by mouth every eight (8) hours as needed for Nausea.   Qty: 16 Tab, Refills: 0             Reference my discharge instructions. Follow-up Appointments   Procedures    FOLLOW UP VISIT Appointment in: Two Weeks     Standing Status:   Standing     Number of Occurrences:   1     Order Specific Question:   Appointment in     Answer:    Two Weeks        Signed By:  Malcolm Adames MD     October 8, 2017

## 2017-10-08 NOTE — PROGRESS NOTES
Post-Partum Day Number 2 Progress Note    Anuja RUTHIE Aguilar     Assessment: Doing well, post partum day 2    700cc PPH: immediate PPH from vaginal and perineal laceration, lochia normal, VS WNL, Hgb 13.4 to 9.3, continue to monitor     3rd degree perineal laceration: s/p repair. Bowel regimen ordered to avoid constipation     Rh neg: baby Rh positive, Rhogam given       Plan:   1. Discharge home today  2. Follow up in office in 2 and 6 weeks with Eliezer Kirk MD  3. Post partum activity advised, diet as tolerated  4. Discharge Medications: ibuprofen, percocet and medications prior to admission    Information for the patient's :  Cuco Lou [[id number]]   Vaginal, Vacuum (Extractor)    S: Patient doing well without significant complaint. Voiding without difficulty, normal lochia. Vitals:  Visit Vitals    /84 (BP 1 Location: Right arm, BP Patient Position: Sitting)    Pulse 100    Temp 98.6 °F (37 °C)    Resp 18    Ht 5' 8\" (1.727 m)    Wt 99.8 kg (220 lb)    SpO2 100%    Breastfeeding Yes    BMI 33.45 kg/m2     Temp (24hrs), Av.5 °F (36.9 °C), Min:98.2 °F (36.8 °C), Max:98.7 °F (37.1 °C)      Exam:         Patient without distress. Abdomen soft, fundus firm, nontender                 Lower extremities are negative for swelling, cords or tenderness. Labs:     Lab Results   Component Value Date/Time    WBC 19.7 10/07/2017 07:54 AM    WBC 8.8 10/05/2017 09:33 AM    WBC 12.7 2017 05:39 AM    HGB 9.3 10/07/2017 07:54 AM    HGB 13.4 10/05/2017 09:33 AM    HGB 13.8 2017 05:39 AM    HCT 26.2 10/07/2017 07:54 AM    HCT 37.3 10/05/2017 09:33 AM    HCT 38.6 2017 05:39 AM    PLATELET 175  07:54 AM    PLATELET 159  09:33 AM    PLATELET 549  05:39 AM       No results found for this or any previous visit (from the past 24 hour(s)).

## 2017-10-08 NOTE — ROUTINE PROCESS
Bedside and Verbal shift change report given to Sophy Mendoza RN (oncoming nurse) by Lucie Coffman RN (offgoing nurse). Report included the following information SBAR, Procedure Summary, Intake/Output and MAR.

## 2017-10-08 NOTE — DISCHARGE INSTRUCTIONS
POST DELIVERY DISCHARGE INSTRUCTIONS    Name: Sukumar Schmitt  YOB: 1990  Primary Diagnosis: Active Problems:    Normal labor (10/5/2017)        General:     Diet/Diet Restrictions:  · Eight 8-ounce glasses of fluid daily (water, juices); avoid excessive caffeine intake. · Meals/snacks as desired which are high in fiber and carbohydrates and low in fat and cholesterol. Physical Activity / Restrictions / Safety:     · Avoid heavy lifting, no more that 8 lbs. For 2-3 weeks;   · Limit use of stairs to 2 times daily for the first week home. · No driving for one week. · Avoid intercourse 4-6 weeks, no douching or tampon use. · Check with obstetrician before starting or resuming an exercise program.      Discharge Instructions/Special Treatment/Home Care Needs:     · Continue prenatal vitamins. · Continue to use squirt bottle with warm water on your episiotomy after each bathroom use until bleeding stops. · If steri-strips applied to your incision, remove in 7-10 days. Call your doctor for the following:     · Fever over 101 degrees by mouth. · Vaginal bleeding heavier than a normal menstrual period or clots larger than a golf ball. · Red streaks or increased swelling of legs, painful red streaks on your breast.  · Painful urination, constipation and increased pain or swelling or discharge with your incision. · If you feel extremely anxious or overwhelmed. · If you have thoughts of harming yourself and/or your baby. Pain Management:     · Take Acetaminophen (Tylenol) or Ibuprofen (Advil, Motrin), as directed for pain. · Use a warm Sitz bath 3 times daily to relieve episiotomy or hemorrhoidal discomfort. · For hemorrhoidal discomfort, use Tucks and Anusol cream as needed and directed. · Heating pad to  incision as needed.      Follow-Up Care:     Appointment with MD:   Follow-up Appointments   Procedures    FOLLOW UP VISIT Appointment in: Two Weeks     Standing Status: Standing     Number of Occurrences:   1     Order Specific Question:   Appointment in     Answer: Two Weeks     Telephone number: [phone]    Signed By: Kaye Ram MD                                                                                                   Date: 10/8/2017 Time: 8:54 AM    Vaginal Childbirth (Postpartum Care): After Your Visit     Your Care Instructions    After childbirth (postpartum period), your body goes through many changes. Some of these changes happen over several weeks. It is easy to get too tired and overwhelmed during the first weeks after childbirth. Take it easy on yourself. You may find it hard to meet the extra demands on your energy and time. Get rest whenever you can, accept help from others, and eat well and drink plenty of fluids. Your body will slowly heal in the next few weeks. You may feel emotional during this time. Changes in your hormones can shift your mood without warning. No heavy lifting. No more than 8 lbs or the size of baby for 2-3 weeks. Avoid stairs. Limit to 1-2 times per day for the 1st week after delivery. No driving for the first week after delivery. Follow-up care is a key part of your treatment and safety. Be sure to make and go to all appointments, and call your doctor if you are having problems. Its also a good idea to know your test results and keep a list of the medicines you take. Around 4 to 6 weeks after your baby's birth, you will have a follow-up visit with your doctor. This visit is your time to talk to your doctor about anything you are concerned or curious about. Keep a list of questions to bring to your postpartum visit. Your questions might be about:  Changes in your breasts, such as lumps or soreness. When to expect your menstrual period to start again. What form of birth control is best for you. Weight you have put on during the pregnancy. Exercise options.   What foods and drinks are best for you, especially if you are breast-feeding. Problems you might be having with breast-feeding. When you can have sex. Some women may want to talk about lubricants for the vagina. Any feelings of sadness or restlessness that you are having. How can you care for yourself at home? Vaginal bleeding and cramps  After delivery, you will have a bloody discharge from the vagina. This will turn pink within a week and then white or yellow after about 10 days. It may last for 2 to 4 weeks or longer, until the uterus has healed. Do not rinse inside your vagina with fluids (douche). Do not worry if you pass some blood clots, as long as they are smaller than a golf ball. If you have a tear or stitches in your vaginal area, change the pad at least every 4 hours to prevent soreness and infection. You may have cramps for the first few days after childbirth. These are normal and occur as the uterus shrinks to normal size. Take an over-the-counter pain medicine, such as acetaminophen (Tylenol), ibuprofen (Advil, Motrin), or naproxen (Aleve), for cramps. Read and follow all instructions on the label. Do not take aspirin, because it can cause more bleeding. Do not take two or more pain medicines at the same time unless the doctor told you to. Many pain medicines have acetaminophen, which is Tylenol. Too much acetaminophen (Tylenol) can be harmful. Stitches  If you have stitches, they will dissolve on their own and do not need to be removed. Follow your doctor's instructions for cleaning the stitched area. Put ice or a cold pack on your painful area for 10 to 20 minutes at a time. , several times a day, for the first few days. Put a thin cloth between the ice and your skin. Sit in a few inches of warm water (sitz bath) 3 times a day and after bowel movements. The warm water helps with pain and itching. If you do not have a tub, a warm shower might help.   Keep the area clean by pouring or spraying warm water over the area outside your vagina and anus after you use the toilet. Breast fullness  Your breasts may overfill (engorge) in the first few days after delivery. To help milk flow and to relieve pain, warm your breasts in the shower or by using warm, moist towels before nursing. If you are not nursing, do not put warmth on your breasts or touch your breasts. Wear a tight bra or sports bra and use ice until the fullness goes away. This usually takes 2 to 3 days. Put ice or a cold pack on your breast after nursing to reduce swelling and pain. Put a thin cloth between the ice and your skin. Activity  Eat a balanced diet. Do not try to lose weight by cutting calories. Keep taking your prenatal vitamins, or take a multivitamin. ·  Get help with household chores from family or friends, if you can. Do not try to do it all yourself. Get as much rest as you can. Try to take naps when your baby sleeps during the day. Get some exercise every day. But do not do any heavy exercise until your doctor says it is okay. Do not have sex or use tampons until you have stopped bleeding and at least 4 to 6 weeks have passed since you gave birth. Talk to your doctor about birth control. You can get pregnant even before your period returns. Also, you can get pregnant while you are breast-feeding. Mental health  It is normal to have some sadness, anxiety, sleeplessness, and mood swings after you go home. If you feel upset or hopeless for more than a few days or are having trouble doing the things you need to do, talk to your doctor. · Many women get the \"baby blues\" during the first few days after childbirth. The \"baby blues\" usually peak around the fourth day and then ease up in less than 2 weeks. If you have the \"baby blues\" for more than a few days, or if you have thoughts of hurting yourself or your baby, call your doctor right away. Constipation and hemorrhoids  Drink plenty of fluids, enough so that your urine is light yellow or clear like water. If you have kidney, heart, or liver disease and have to limit fluids, talk with your doctor before you increase the amount of fluids you drink. Eat plenty of fiber each day to avoid constipation. Include foods such as whole-grain breads and cereals, raw vegetables, raw and dried fruits, and beans. · If you have hemorrhoids or swelling or pain around the opening of your vagina, try using cold and heat. You can put ice or a cold pack on the area for 10 to 20 minutes at a time. Put a thin cloth between the ice and your skin. Also try sitting in a few inches of warm water (sitz bath) 3 times a day and after bowel movements. When should you call for help? Call 911 anytime you think you may need emergency care. For example, call if:  You are thinking of hurting yourself, your baby, or anyone else. You have sudden, severe pain in your belly. You passed out (lost consciousness). Call your doctor now or seek immediate medical care if:  You have severe vaginal bleeding. You are passing blood clots and soaking through a pad each hour for 2 or more hours. Your vaginal bleeding seems to be getting heavier or is still bright red 4 days after delivery, or you pass blood clots larger than the size of a golf ball. You are dizzy or lightheaded, or you feel like you may faint. You are vomiting or cannot keep fluids down. You have a fever. You have new or more belly pain. You pass tissue (not just blood). Your vaginal discharge smells bad. Your belly feels tender or full and hard. Your breasts are continuously painful or red. You feel sad, anxious, or hopeless for more than a few days. Watch closely for changes in your health, and be sure to contact your doctor if you have any problems. Where can you learn more? Go to [email or web address]    Enter P431  in the search box to learn more about \"Postpartum Care: After Your Visit\".     or    Go to [email or web address]    Enter Q237  in the search box to learn more about \"Vaginal Childbirth: After Your Visit\". © 1991-2808 [employer]. Care instructions adapted under license by [facility] (which disclaims liability or warranty for this information). This care instruction is for use with your licensed healthcare professional. If you have questions about a medical condition or this instruction, always ask your healthcare professional. Neyda Och any warranty or liability for your use of this information.     Follow up with [facility] in 2 weeks; call to schedule appointment

## 2017-10-09 LAB
ABO + RH BLD: NORMAL
BLD PROD TYP BPU: NORMAL
BPU ID: NORMAL
FETAL SCREEN,FMHS: NORMAL
STATUS OF UNIT,%ST: NORMAL
UNIT DIVISION, %UDIV: 0
WEAK D AG RBC QL: NORMAL

## 2017-11-16 NOTE — ED NOTES
11/16/17 1153   Vital Signs   Pulse (Heart Rate) 81   BP (!) 183/123   NP informed. Clonidine given. Patient reports feeling better.  Dr. Marta Scruggs notified

## 2021-10-16 ENCOUNTER — HOSPITAL ENCOUNTER (OUTPATIENT)
Dept: GENERAL RADIOLOGY | Age: 31
Discharge: HOME | End: 2021-10-16
Payer: COMMERCIAL

## 2021-10-16 ENCOUNTER — TRANSCRIBE ORDER (OUTPATIENT)
Dept: REGISTRATION | Age: 31
End: 2021-10-16

## 2021-10-16 DIAGNOSIS — J11.00 INFLUENZA AND PNEUMONIA: ICD-10-CM

## 2021-10-16 DIAGNOSIS — J11.00 INFLUENZA AND PNEUMONIA: Primary | ICD-10-CM

## 2021-10-16 PROCEDURE — 71046 X-RAY EXAM CHEST 2 VIEWS: CPT

## 2022-03-19 PROBLEM — Z37.9 NORMAL LABOR: Status: ACTIVE | Noted: 2017-10-05

## 2023-05-10 RX ORDER — OXYCODONE HYDROCHLORIDE AND ACETAMINOPHEN 5; 325 MG/1; MG/1
1 TABLET ORAL EVERY 4 HOURS PRN
COMMUNITY
Start: 2017-10-08

## 2023-05-10 RX ORDER — HYDROMORPHONE HYDROCHLORIDE 2 MG/1
TABLET ORAL EVERY 4 HOURS PRN
COMMUNITY
Start: 2017-10-08

## 2023-05-10 RX ORDER — FERROUS SULFATE 325(65) MG
TABLET ORAL 2 TIMES DAILY WITH MEALS
COMMUNITY
Start: 2017-10-08

## 2023-05-10 RX ORDER — POLYETHYLENE GLYCOL 3350 17 G/17G
POWDER, FOR SOLUTION ORAL DAILY PRN
COMMUNITY
Start: 2017-10-08

## 2023-05-10 RX ORDER — ONDANSETRON 4 MG/1
TABLET, ORALLY DISINTEGRATING ORAL EVERY 8 HOURS PRN
COMMUNITY
Start: 2017-02-06

## 2025-02-17 NOTE — ANESTHESIA PREPROCEDURE EVALUATION
Anesthetic History   No history of anesthetic complications            Review of Systems / Medical History  Patient summary reviewed, nursing notes reviewed and pertinent labs reviewed    Pulmonary  Within defined limits                 Neuro/Psych   Within defined limits           Cardiovascular  Within defined limits                Exercise tolerance: >4 METS     GI/Hepatic/Renal  Within defined limits              Endo/Other        Obesity     Other Findings            Physical Exam    Airway  Mallampati: II  TM Distance: > 6 cm  Neck ROM: normal range of motion   Mouth opening: Normal     Cardiovascular  Regular rate and rhythm,  S1 and S2 normal,  no murmur, click, rub, or gallop             Dental  No notable dental hx       Pulmonary  Breath sounds clear to auscultation               Abdominal  GI exam deferred       Other Findings            Anesthetic Plan    ASA: 2  Anesthesia type: spinal and epidural  CSE          Anesthetic plan and risks discussed with: Patient Patient is here with her Mother and scheduled an appoitment for herself for Wednesday 2-.  Patient is having issues with medication.  Patient did not give PSR any information and said it could wait till Wednesday.  Please call.